# Patient Record
Sex: FEMALE | Race: OTHER | HISPANIC OR LATINO
[De-identification: names, ages, dates, MRNs, and addresses within clinical notes are randomized per-mention and may not be internally consistent; named-entity substitution may affect disease eponyms.]

---

## 2022-04-12 ENCOUNTER — FORM ENCOUNTER (OUTPATIENT)
Age: 27
End: 2022-04-12

## 2022-04-13 ENCOUNTER — INPATIENT (INPATIENT)
Facility: HOSPITAL | Age: 27
LOS: 0 days | Discharge: HOME | DRG: 101 | End: 2022-04-14
Attending: PSYCHIATRY & NEUROLOGY | Admitting: PSYCHIATRY & NEUROLOGY
Payer: MEDICAID

## 2022-04-13 VITALS
TEMPERATURE: 99 F | OXYGEN SATURATION: 97 % | DIASTOLIC BLOOD PRESSURE: 75 MMHG | SYSTOLIC BLOOD PRESSURE: 122 MMHG | HEART RATE: 92 BPM | RESPIRATION RATE: 18 BRPM | WEIGHT: 125 LBS

## 2022-04-13 LAB
ALBUMIN SERPL ELPH-MCNC: 4.6 G/DL — SIGNIFICANT CHANGE UP (ref 3.3–5)
ALP SERPL-CCNC: 52 U/L — SIGNIFICANT CHANGE UP (ref 40–120)
ALT FLD-CCNC: 27 U/L — SIGNIFICANT CHANGE UP (ref 10–45)
ANION GAP SERPL CALC-SCNC: 8 MMOL/L — SIGNIFICANT CHANGE UP (ref 5–17)
AST SERPL-CCNC: 27 U/L — SIGNIFICANT CHANGE UP (ref 10–40)
BASE EXCESS BLDV CALC-SCNC: 2.2 MMOL/L — SIGNIFICANT CHANGE UP (ref -2–3)
BASOPHILS # BLD AUTO: 0.04 K/UL — SIGNIFICANT CHANGE UP (ref 0–0.2)
BASOPHILS NFR BLD AUTO: 0.5 % — SIGNIFICANT CHANGE UP (ref 0–2)
BILIRUB SERPL-MCNC: 0.3 MG/DL — SIGNIFICANT CHANGE UP (ref 0.2–1.2)
BUN SERPL-MCNC: 12 MG/DL — SIGNIFICANT CHANGE UP (ref 7–23)
CA-I SERPL-SCNC: 1.23 MMOL/L — SIGNIFICANT CHANGE UP (ref 1.15–1.33)
CALCIUM SERPL-MCNC: 9.3 MG/DL — SIGNIFICANT CHANGE UP (ref 8.4–10.5)
CHLORIDE SERPL-SCNC: 102 MMOL/L — SIGNIFICANT CHANGE UP (ref 96–108)
CO2 BLDV-SCNC: 29.8 MMOL/L — HIGH (ref 22–26)
CO2 SERPL-SCNC: 27 MMOL/L — SIGNIFICANT CHANGE UP (ref 22–31)
CREAT SERPL-MCNC: 0.57 MG/DL — SIGNIFICANT CHANGE UP (ref 0.5–1.3)
EGFR: 127 ML/MIN/1.73M2 — SIGNIFICANT CHANGE UP
EOSINOPHIL # BLD AUTO: 0.07 K/UL — SIGNIFICANT CHANGE UP (ref 0–0.5)
EOSINOPHIL NFR BLD AUTO: 0.8 % — SIGNIFICANT CHANGE UP (ref 0–6)
ETHANOL SERPL-MCNC: <10 MG/DL — SIGNIFICANT CHANGE UP (ref 0–10)
GAS PNL BLDV: 133 MMOL/L — LOW (ref 136–145)
GAS PNL BLDV: SIGNIFICANT CHANGE UP
GLUCOSE SERPL-MCNC: 97 MG/DL — SIGNIFICANT CHANGE UP (ref 70–99)
HCG SERPL-ACNC: <0 MIU/ML — SIGNIFICANT CHANGE UP
HCO3 BLDV-SCNC: 28 MMOL/L — SIGNIFICANT CHANGE UP (ref 22–29)
HCT VFR BLD CALC: 39.2 % — SIGNIFICANT CHANGE UP (ref 34.5–45)
HGB BLD-MCNC: 12.9 G/DL — SIGNIFICANT CHANGE UP (ref 11.5–15.5)
IMM GRANULOCYTES NFR BLD AUTO: 0.6 % — SIGNIFICANT CHANGE UP (ref 0–1.5)
LACTATE SERPL-SCNC: 0.9 MMOL/L — SIGNIFICANT CHANGE UP (ref 0.5–2)
LYMPHOCYTES # BLD AUTO: 0.9 K/UL — LOW (ref 1–3.3)
LYMPHOCYTES # BLD AUTO: 10.9 % — LOW (ref 13–44)
MCHC RBC-ENTMCNC: 27.8 PG — SIGNIFICANT CHANGE UP (ref 27–34)
MCHC RBC-ENTMCNC: 32.9 GM/DL — SIGNIFICANT CHANGE UP (ref 32–36)
MCV RBC AUTO: 84.5 FL — SIGNIFICANT CHANGE UP (ref 80–100)
MONOCYTES # BLD AUTO: 0.52 K/UL — SIGNIFICANT CHANGE UP (ref 0–0.9)
MONOCYTES NFR BLD AUTO: 6.3 % — SIGNIFICANT CHANGE UP (ref 2–14)
NEUTROPHILS # BLD AUTO: 6.71 K/UL — SIGNIFICANT CHANGE UP (ref 1.8–7.4)
NEUTROPHILS NFR BLD AUTO: 80.9 % — HIGH (ref 43–77)
NRBC # BLD: 0 /100 WBCS — SIGNIFICANT CHANGE UP (ref 0–0)
PCO2 BLDV: 49 MMHG — HIGH (ref 39–42)
PH BLDV: 7.37 — SIGNIFICANT CHANGE UP (ref 7.32–7.43)
PLATELET # BLD AUTO: 237 K/UL — SIGNIFICANT CHANGE UP (ref 150–400)
PO2 BLDV: 53 MMHG — HIGH (ref 25–45)
POTASSIUM BLDV-SCNC: 4.1 MMOL/L — SIGNIFICANT CHANGE UP (ref 3.5–5.1)
POTASSIUM SERPL-MCNC: 4 MMOL/L — SIGNIFICANT CHANGE UP (ref 3.5–5.3)
POTASSIUM SERPL-SCNC: 4 MMOL/L — SIGNIFICANT CHANGE UP (ref 3.5–5.3)
PROT SERPL-MCNC: 7.2 G/DL — SIGNIFICANT CHANGE UP (ref 6–8.3)
RBC # BLD: 4.64 M/UL — SIGNIFICANT CHANGE UP (ref 3.8–5.2)
RBC # FLD: 13.8 % — SIGNIFICANT CHANGE UP (ref 10.3–14.5)
SAO2 % BLDV: 85.3 % — SIGNIFICANT CHANGE UP (ref 67–88)
SARS-COV-2 RNA SPEC QL NAA+PROBE: NEGATIVE — SIGNIFICANT CHANGE UP
SODIUM SERPL-SCNC: 137 MMOL/L — SIGNIFICANT CHANGE UP (ref 135–145)
WBC # BLD: 8.29 K/UL — SIGNIFICANT CHANGE UP (ref 3.8–10.5)
WBC # FLD AUTO: 8.29 K/UL — SIGNIFICANT CHANGE UP (ref 3.8–10.5)

## 2022-04-13 PROCEDURE — 70450 CT HEAD/BRAIN W/O DYE: CPT | Mod: 26,MA

## 2022-04-13 PROCEDURE — 95718 EEG PHYS/QHP 2-12 HR W/VEEG: CPT

## 2022-04-13 PROCEDURE — 99285 EMERGENCY DEPT VISIT HI MDM: CPT

## 2022-04-13 PROCEDURE — 93010 ELECTROCARDIOGRAM REPORT: CPT

## 2022-04-13 RX ORDER — SODIUM CHLORIDE 9 MG/ML
1000 INJECTION INTRAMUSCULAR; INTRAVENOUS; SUBCUTANEOUS ONCE
Refills: 0 | Status: COMPLETED | OUTPATIENT
Start: 2022-04-13 | End: 2022-04-13

## 2022-04-13 RX ORDER — ACETAMINOPHEN 500 MG
650 TABLET ORAL ONCE
Refills: 0 | Status: COMPLETED | OUTPATIENT
Start: 2022-04-13 | End: 2022-04-13

## 2022-04-13 RX ORDER — LEVETIRACETAM 250 MG/1
1000 TABLET, FILM COATED ORAL ONCE
Refills: 0 | Status: COMPLETED | OUTPATIENT
Start: 2022-04-13 | End: 2022-04-13

## 2022-04-13 RX ADMIN — LEVETIRACETAM 400 MILLIGRAM(S): 250 TABLET, FILM COATED ORAL at 16:45

## 2022-04-13 RX ADMIN — Medication 650 MILLIGRAM(S): at 16:34

## 2022-04-13 RX ADMIN — Medication 1 MILLIGRAM(S): at 16:45

## 2022-04-13 RX ADMIN — SODIUM CHLORIDE 1000 MILLILITER(S): 9 INJECTION INTRAMUSCULAR; INTRAVENOUS; SUBCUTANEOUS at 16:34

## 2022-04-13 NOTE — ED PROVIDER NOTE - PROGRESS NOTE DETAILS
[place]: labs/CT pending. d/w debbie from epilepsy - requesting call back once ed w/u complete. Will reassess. [place]: BF heard pt make a noise then pt became stiff and unresponsive - I was at pt bedside within 5 seconds (pt was in [facility]) - pt having generalized tonic/conic shaking of b/l UE/LE, +blood tinged foam at mouth. Shaking lasted ~1min then resolved. Around 1 min later pt had another episode of generalized tonic/clonic shaking that lasted 10 seconds then resolved. Pt very sleepy. rediscussed w/ epilepsy, will admit for further care. [place]: pt now awake, feels tired but no other complaints. new superficial abrasion on L asapect of tongue.

## 2022-04-13 NOTE — PATIENT PROFILE ADULT - FALL HARM RISK - HARM RISK INTERVENTIONS

## 2022-04-13 NOTE — ED PROVIDER NOTE - OBJECTIVE STATEMENT
26F no PMH p/w concern for seizure. 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 Next thing she remembers is being on floor w/ paramedics around her. Pt states that bystanders told her she had a seizure. Pt currently feels anxious but otherwise asymptomatic. Jan 2022 pt had episode of LOC - this episode was witness by her BF - he states that he heard thud and found pt on floor w/ generalized body shaking - unsure who long shaking lasted, eventually resolved. At that time pt had confusion until she arrived at hospital. Reportedly had blood work and CTH wnl. Has not followed up w/ neuro yet. No H seizures. Currently only c/o mild R hip pain where she thinks she fell today.   Has never been officially diagnosed w/ anxiety but states she feels that she has been having panic attacks recently and her PMD referred her to therapist. Not on any meds.   Uses MJ, no other drug/etoh use. No other systemic symptoms.   Denies HA, tongue pain, SOB/CP, URI symptoms, NVD, abd pain, urinary complaints, focal weakness/numbness.

## 2022-04-13 NOTE — ED PROVIDER NOTE - PHYSICAL EXAMINATION
no LE edema, normal equal distal pulses, steady unassisted gait.   No spinal ttp, neck FROM. Strength 5/5. No bony ttp, FROM all extremities. Normal equal distal pulses. Steady unassisted gait.   small superficial abrasion R lateral tongue and tip of tongue, no active bleeding.

## 2022-04-13 NOTE — H&P ADULT - ATTENDING COMMENTS
27 yo F admitted s/p witnessed seizure yesterday. Reporting episodes of dejavu and panic attacks over the past year.  Seen by Neuro in March 2022 and 1 hour routine EEG negative, unsure of MRI brain completed  EEG overnight showed left temporal spikes  Discussed findings with patient and diagnosis of epilepsy  Patient also reports stressors including brother who recently passed away  Will start LTG titration and LEV 500mg BID for now, once LTG therapeutic with stop LEV   Counseled on seizure safety precautions and [place] driving regulations   F/u outpatient in 3-4 weeks
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
## 2022-04-13 NOTE — ED ADULT NURSE NOTE - OBJECTIVE STATEMENT
pt had a witnessed seizure today ,did not hit head , no obvious injuries , pt had a similar episode in January

## 2022-04-13 NOTE — ED PROVIDER NOTE - CLINICAL SUMMARY MEDICAL DECISION MAKING FREE TEXT BOX
26F no PMH p/w concern for seizure. 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 Next thing she remembers is being on floor w/ paramedics around her. Pt states that bystanders told her she had a seizure. Pt currently feels anxious but otherwise asymptomatic. Jan 2022 pt had episode of LOC - this episode was witness by her BF - he states that he heard thud and found pt on floor w/ generalized body shaking - unsure who long shaking lasted, eventually resolved. At that time pt had confusion until she arrived at hospital. Reportedly had blood work and CTH wnl. Has not followed up w/ neuro yet. No H seizures. Currently only c/o mild R hip pain where she thinks she fell today.   Vitals wnl, exam as above.  ddx: concern for 2nd seizure. No clinically significant traumatic injury.  Labs, CT, attempt to d/w epilepsy.

## 2022-04-13 NOTE — ED ADULT TRIAGE NOTE - CHIEF COMPLAINT QUOTE
PT mg from work presents w/ bystander report of 2 min grand mal appearing seizure. Per EMS report bystander assisted pt to the ground and held her head. PT denies incontinence, states she feels like she bit her tongue, denies difficulty speaking or swallowing. Pt alert, oriented X3. Pt reports similar event in january w/o neurology follow up, reports she had an outpatient ct scan 1 month ago to investigate panic attacks.

## 2022-04-13 NOTE — H&P ADULT - ASSESSMENT
26y Female with reported history of "panic attacks", no other PMHx, presented to ED with witnessed seizure episodes, with tongue biting.    #New onset seizures  -CTH negative, infectious and metabolic workup negative  -12-24H VEEG monitoring  -Seizure precautions  -Ativan 2 mg IV PRN for seizures> 2min  -Keep Mg>2  -Fall precautions    #No indication for DVT/GI prophylaxis  #Ambulate as tolerated  #Diet: Regular   26y Female with reported history of "panic attacks", no other PMHx, presented to ED with witnessed seizure episodes, with tongue biting.    #New onset seizures  -CTH negative, infectious and metabolic workup negative  -12-24H VEEG monitoring  -Seizure precautions  -Ativan 2 mg IV PRN for seizures> 2min  -Keep Mg>2  -Fall precautions  -No AED for now pending VEEG results    #No indication for DVT/GI prophylaxis  #Ambulate as tolerated  #Diet: Regular

## 2022-04-14 ENCOUNTER — TRANSCRIPTION ENCOUNTER (OUTPATIENT)
Age: 27
End: 2022-04-14

## 2022-04-14 VITALS
OXYGEN SATURATION: 99 % | DIASTOLIC BLOOD PRESSURE: 61 MMHG | TEMPERATURE: 98 F | RESPIRATION RATE: 16 BRPM | SYSTOLIC BLOOD PRESSURE: 97 MMHG | HEART RATE: 60 BPM

## 2022-04-14 PROBLEM — Z00.00 ENCOUNTER FOR PREVENTIVE HEALTH EXAMINATION: Status: ACTIVE | Noted: 2022-04-14

## 2022-04-14 LAB
AMPHET UR-MCNC: NEGATIVE — SIGNIFICANT CHANGE UP
BARBITURATES UR SCN-MCNC: NEGATIVE — SIGNIFICANT CHANGE UP
BENZODIAZ UR-MCNC: NEGATIVE — SIGNIFICANT CHANGE UP
COCAINE METAB.OTHER UR-MCNC: NEGATIVE — SIGNIFICANT CHANGE UP
METHADONE UR-MCNC: NEGATIVE — SIGNIFICANT CHANGE UP
OPIATES UR-MCNC: NEGATIVE — SIGNIFICANT CHANGE UP
PCP SPEC-MCNC: SIGNIFICANT CHANGE UP
PCP UR-MCNC: NEGATIVE — SIGNIFICANT CHANGE UP
THC UR QL: POSITIVE

## 2022-04-14 PROCEDURE — 99223 1ST HOSP IP/OBS HIGH 75: CPT

## 2022-04-14 PROCEDURE — 95720 EEG PHY/QHP EA INCR W/VEEG: CPT

## 2022-04-14 RX ORDER — LAMOTRIGINE 25 MG/1
50 TABLET, ORALLY DISINTEGRATING ORAL EVERY 24 HOURS
Refills: 0 | Status: CANCELLED | OUTPATIENT
Start: 2022-04-28 | End: 2022-04-14

## 2022-04-14 RX ORDER — LAMOTRIGINE 25 MG/1
1 TABLET, ORALLY DISINTEGRATING ORAL
Qty: 30 | Refills: 3
Start: 2022-04-14 | End: 2022-08-11

## 2022-04-14 RX ORDER — LAMOTRIGINE 25 MG/1
1 TABLET, ORALLY DISINTEGRATING ORAL
Qty: 30 | Refills: 1
Start: 2022-04-14 | End: 2022-06-12

## 2022-04-14 RX ORDER — LEVETIRACETAM 250 MG/1
1 TABLET, FILM COATED ORAL
Qty: 60 | Refills: 2
Start: 2022-04-14 | End: 2022-07-12

## 2022-04-14 RX ORDER — LEVETIRACETAM 250 MG/1
1 TABLET, FILM COATED ORAL
Qty: 60 | Refills: 4
Start: 2022-04-14 | End: 2022-09-10

## 2022-04-14 RX ORDER — LEVETIRACETAM 250 MG/1
500 TABLET, FILM COATED ORAL EVERY 12 HOURS
Refills: 0 | Status: DISCONTINUED | OUTPATIENT
Start: 2022-04-14 | End: 2022-04-14

## 2022-04-14 RX ORDER — LAMOTRIGINE 25 MG/1
25 TABLET, ORALLY DISINTEGRATING ORAL EVERY 24 HOURS
Refills: 0 | Status: DISCONTINUED | OUTPATIENT
Start: 2022-04-14 | End: 2022-04-14

## 2022-04-14 RX ORDER — LAMOTRIGINE 25 MG/1
100 TABLET, ORALLY DISINTEGRATING ORAL EVERY 24 HOURS
Refills: 0 | Status: CANCELLED | OUTPATIENT
Start: 2022-05-11 | End: 2022-04-14

## 2022-04-14 RX ORDER — LAMOTRIGINE 25 MG/1
2 TABLET, ORALLY DISINTEGRATING ORAL
Qty: 28 | Refills: 0
Start: 2022-04-14 | End: 2022-04-27

## 2022-04-14 RX ORDER — LAMOTRIGINE 25 MG/1
1 TABLET, ORALLY DISINTEGRATING ORAL
Qty: 13 | Refills: 0
Start: 2022-04-14 | End: 2022-04-26

## 2022-04-14 RX ADMIN — LEVETIRACETAM 500 MILLIGRAM(S): 250 TABLET, FILM COATED ORAL at 15:49

## 2022-04-14 RX ADMIN — LAMOTRIGINE 25 MILLIGRAM(S): 25 TABLET, ORALLY DISINTEGRATING ORAL at 15:49

## 2022-04-14 NOTE — PROGRESS NOTE ADULT - SUBJECTIVE AND OBJECTIVE BOX
INTERVAL HPI/OVERNIGHT EVENTS:  O/N:  This morning: Patient was seen and examined at bedside.      VITAL SIGNS:  T(F): 98.2 (04-14-22 @ 12:08)  HR: 60 (04-14-22 @ 12:08)  BP: 97/61 (04-14-22 @ 12:08)  RR: 16 (04-14-22 @ 12:08)  SpO2: 99% (04-14-22 @ 12:08)  Wt(kg): --    PHYSICAL EXAM:    Constitutional: NAD  HEENT: PERRL, EOMI, sclera non-icteric, neck supple, trachea midline, no masses, no JVD, MMM, good dentition  Respiratory: CTA b/l, good air entry b/l, no wheezing, no rhonchi, no rales, without accessory muscle use and no intercostal retractions  Cardiovascular: RRR, normal S1S2, no M/R/G  Gastrointestinal: abdomen soft, NTND, no masses palpable, BS normal  Extremities: Warm, well perfused, pulses equal bilateral upper and lower extremities, no edema, no clubbing  Neurological: AAOx3, CN Grossly intact  Skin: Normal temperature, warm, dry    MEDICATIONS  (STANDING):    MEDICATIONS  (PRN):  LORazepam   Injectable 2 milliGRAM(s) IV Push once PRN seizures      Allergies    No Known Allergies    Intolerances        LABS:                        12.9   8.29  )-----------( 237      ( 13 Apr 2022 16:30 )             39.2     04-13    137  |  102  |  12  ----------------------------<  97  4.0   |  27  |  0.57    Ca    9.3      13 Apr 2022 16:30    TPro  7.2  /  Alb  4.6  /  TBili  0.3  /  DBili  x   /  AST  27  /  ALT  27  /  AlkPhos  52  04-13                RADIOLOGY & ADDITIONAL TESTS:  Reviewed

## 2022-04-14 NOTE — DISCHARGE NOTE NURSING/CASE MANAGEMENT/SOCIAL WORK - NSDCPEFALRISK_GEN_ALL_CORE
For information on Fall & Injury Prevention, visit: [email or web address] OR  [email or web address] OR  https://www.cdc.gov/steadi/patient.html

## 2022-04-14 NOTE — DISCHARGE NOTE PROVIDER - NSDCFUSCHEDAPPT_GEN_ALL_CORE_FT
JOLEEN RIVERA ; 06/27/2022 ; NPP Neuro [address] JOLEEN RIVERA ; 05/13/2022 ; P Neuro [address]  JOLEEN RIVERA ; 06/27/2022 ; [facility] Neuro [address]

## 2022-04-14 NOTE — DISCHARGE NOTE PROVIDER - NSDCMRMEDTOKEN_GEN_ALL_CORE_FT
LaMICtal 25 mg oral tablet: Please take one (25mg) tablet once per day until 4/28  lamoTRIgine 100 mg oral tablet: 1 tab(s) orally once a day starting on 5/12  lamoTRIgine 25 mg oral tablet: 2 tab(s) orally once a day from 4/28-5/12  levETIRAcetam 500 mg oral tablet: 1 tab(s) orally every 12 hours

## 2022-04-14 NOTE — DISCHARGE NOTE PROVIDER - NSDCFUADDAPPT_GEN_ALL_CORE_FT
Please bring your Insurance card, Photo ID and Discharge paperwork for the following appointment:    (1) Please follow up with your Neurology Provider, Dr. Brenda Ward at [address], [address] [place] [address] on 06/27/2022 at 4:20pm.    Please note that the office of Dr. Ward will contact you for an earlier appointment once available.    Appointment was scheduled by Ms. CAPRICE Piña, Referral Coordinator.

## 2022-04-14 NOTE — DISCHARGE NOTE PROVIDER - NSDCCPCAREPLAN_GEN_ALL_CORE_FT
PRINCIPAL DISCHARGE DIAGNOSIS  Diagnosis: Seizure  Assessment and Plan of Treatment: Instructions:  - Please take keppra 500 mg in the morning and at night  - Please take Lamotrigine 25 mg daily for two weeks, followe by 50 mg for two weeks, followed by 100 mg daily (dates on the bottle)  - Please follow up with Dr. Ward on Brenda Ward at [address] [place] [address] on 06/27/2022 at 4:20pm.

## 2022-04-14 NOTE — DISCHARGE NOTE NURSING/CASE MANAGEMENT/SOCIAL WORK - NSDCFUADDAPPT_GEN_ALL_CORE_FT
Please bring your Insurance card, Photo ID and Discharge paperwork for the following appointment:    (1) Please follow up with your Neurology Provider, Dr. Brenda Ward at [address], [address] [place] [address] on 06/27/2022 at 4:20pm.    Please note that the office of Dr. Ward will contact you for an earlier appointment once available.    Appointment was scheduled by Ms. CAPRICE Piña, Referral Coordinator.

## 2022-04-14 NOTE — EEG REPORT - NS EEG TEXT BOX
[facility] Department of Neurology  Epilepsy Monitoring Unit video-Electroencephalogram    Patient Name:	JOLEEN RIVERA    :	1995  MRN:	[id number]    Study Start Date/Time:  2022, 7:56 PM  Study End Date/Time:	in progress    Referred by: Brenda Ward MD    Brief Clinical History:  JOLEEN RIVERA is a 26 year-old Female admitted to EMU to characterize AMS/LOC events; study performed to investigate for seizures or markers of epilepsy.    Diagnosis Code:   R56.9 convulsions/seizure  CPT: 28690 EEG with video 12-26h  Technical CPT: 89459 set-up +  02836 vEEG Continuous monitoring 12-26h    Pertinent Medications:  n/a    Acquisition Details:  Electroencephalography was acquired using a minimum of 21 channels on an [employer] Neurology system v 8.5.1 with electrode placement according to the standard International 10-20 system following ACNS (American Clinical Neurophysiology Society) guidelines for Long-Term Video EEG monitoring.  Anterior temporal T1 and T2 electrodes were utilized whenever possible.   The [employer]TEK automated spike & seizure detections were all reviewed in detail, in addition to extensive portions of raw EEG.  The live video was continuously monitored by trained technicians to identify events and specialty nurses trained in seizure management supervised the care of the patient in the epilepsy unit.    Day 1: 2022 @ 7:56 PM to next morning @ 7:00 AM  Background: continuous, with predominantly alpha and beta frequencies.  Symmetry:  No persistent asymmetries of voltage or frequency.  Posterior Dominant Rhythm:  11 Hz symmetric, well-organized, and poorly-modulated.  Organization: Normal anterior to posterior gradient.  Voltage:  Normal (20+ uV)  Variability: Yes. 		Reactivity: Yes.  N2 sleep: Symmetric, synchronous spindles and K complexes.  Spontaneous Activity:  No epileptiform discharges.  Periodic/rhythmic activity:  None.  Events:  No electrographic seizures or significant clinical events.  Provocations:  Hyperventilation and Photic stimulation: was not performed.    Daily Summary:    Normal awake and asleep overnight EEG recording.    Samm Anderson DO  CNP Fellow    Brenda Ward MD  Attending Neurologist, [facility] Epilepsy Program   [facility] Department of Neurology  Epilepsy Monitoring Unit video-Electroencephalogram    Patient Name:	JOLEEN RIVERA    :	1995  MRN:	[id number]    Study Start Date/Time:  2022, 7:56 PM  Study End Date/Time:	in progress    Referred by: Brenda Ward MD    Brief Clinical History:  JOLEEN RIVERA is a 26 year-old Female admitted to EMU to characterize AMS/LOC events; study performed to investigate for seizures or markers of epilepsy.    Diagnosis Code:   R56.9 convulsions/seizure  CPT: 05632 EEG with video 12-26h  Technical CPT: 98957 set-up +  61078 vEEG Continuous monitoring 12-26h    Pertinent Medications:  n/a    Acquisition Details:  Electroencephalography was acquired using a minimum of 21 channels on an [employer] Neurology system v 8.5.1 with electrode placement according to the standard International 10-20 system following ACNS (American Clinical Neurophysiology Society) guidelines for Long-Term Video EEG monitoring.  Anterior temporal T1 and T2 electrodes were utilized whenever possible.   The [employer] automated spike & seizure detections were all reviewed in detail, in addition to extensive portions of raw EEG.  The live video was continuously monitored by trained technicians to identify events and specialty nurses trained in seizure management supervised the care of the patient in the epilepsy unit.    Day 1: 2022 @ 7:56 PM to next morning @ 7:00 AM  Background: continuous, with predominantly alpha and beta frequencies.  Symmetry:  No persistent asymmetries of voltage or frequency.  Posterior Dominant Rhythm:  11 Hz symmetric, well-organized, and poorly-modulated.  Organization: Normal anterior to posterior gradient.  Voltage:  Normal (20+ uV)  Variability: Yes. 		Reactivity: Yes.  N2 sleep: Symmetric, synchronous spindles and K complexes.  Spontaneous Activity:  Occasional (1+/hr <1/min) left temporal small spike waves with phase reversal at T3, present in N2 and slow wave sleep.   Periodic/rhythmic activity:  None.  Events:  No electrographic seizures or significant clinical events.  Provocations:  Hyperventilation and Photic stimulation: was not performed.    Daily Summary:    Occasional sleep-activated epileptiform discharges over the left temporal region. No electrographic seizures recorded.     Samm Anderson DO  CNP Fellow    Brenda Ward MD  Attending Neurologist, [facility] Epilepsy Program   [facility] Department of Neurology  Epilepsy Monitoring Unit video-Electroencephalogram    Patient Name:	JOLEEN RIVERA    :	1995  MRN:	[id number]    Study Start Date/Time:  2022, 7:56 PM  Study End Date/Time:	in progress    Referred by: Bernda Ward MD    Brief Clinical History:  JOLEEN RIVERA is a 26 year-old Female admitted to EMU to characterize AMS/LOC events; study performed to investigate for seizures or markers of epilepsy.    Diagnosis Code:   R56.9 convulsions/seizure  CPT: 16644 EEG with video 12-26h  Technical CPT: 22720 set-up +  92223 vEEG Continuous monitoring 12-26h    Pertinent Medications:  n/a    Acquisition Details:  Electroencephalography was acquired using a minimum of 21 channels on an [employer] Neurology system v 8.5.1 with electrode placement according to the standard International 10-20 system following ACNS (American Clinical Neurophysiology Society) guidelines for Long-Term Video EEG monitoring.  Anterior temporal T1 and T2 electrodes were utilized whenever possible.   The [employer] automated spike & seizure detections were all reviewed in detail, in addition to extensive portions of raw EEG.  The live video was continuously monitored by trained technicians to identify events and specialty nurses trained in seizure management supervised the care of the patient in the epilepsy unit.    Day 1: 2022 @ 7:56 PM to next morning @ 7:00 AM  Background: continuous, with predominantly alpha and beta frequencies.  Symmetry:  No persistent asymmetries of voltage or frequency.  Posterior Dominant Rhythm:  11 Hz symmetric, well-organized, and poorly-modulated.  Organization: Normal anterior to posterior gradient.  Voltage:  Normal (20+ uV)  Variability: Yes. 		Reactivity: Yes.  N2 sleep: Symmetric, synchronous spindles and K complexes.  Spontaneous Activity:  Occasional (1+/hr <1/min) left temporal spike waves with phase reversal at T3, present in N2 and slow wave sleep.   Periodic/rhythmic activity:  None.  Events:  No electrographic seizures or significant clinical events.  Provocations:  Hyperventilation and Photic stimulation: was not performed.    Daily Summary:    Occasional sleep-activated epileptiform discharges over the left temporal region. No electrographic seizures recorded.     Samm Anderson DO  CNP Fellow    Brenda Ward MD  Attending Neurologist, [facility] Epilepsy Program   [facility] Department of Neurology  Epilepsy Monitoring Unit video-Electroencephalogram    Patient Name:	JOLEEN RIVERA    :	1995  MRN:	[id number]    Study Start Date/Time:  2022, 7:56 PM   Study End Date/Time:	in progress    Referred by: Brenda Ward MD    Brief Clinical History:  JOLEEN RIVERA is a 26 year-old Female admitted to EMU to characterize AMS/LOC events; study performed to investigate for seizures or markers of epilepsy.    Diagnosis Code:   R56.9 convulsions/seizure  CPT: 49386 EEG with video 12-26h  Technical CPT: 63311 set-up +  12798 vEEG Continuous monitoring 12-26h    Pertinent Medications:  n/a    Acquisition Details:  Electroencephalography was acquired using a minimum of 21 channels on an [employer] Neurology system v 8.5.1 with electrode placement according to the standard International 10-20 system following ACNS (American Clinical Neurophysiology Society) guidelines for Long-Term Video EEG monitoring.  Anterior temporal T1 and T2 electrodes were utilized whenever possible.   The [employer] automated spike & seizure detections were all reviewed in detail, in addition to extensive portions of raw EEG.  The live video was continuously monitored by trained technicians to identify events and specialty nurses trained in seizure management supervised the care of the patient in the epilepsy unit.    Day 1: 2022 @ 7:56 PM to next morning @ 7:00 AM  Background: continuous, with predominantly alpha and beta frequencies.  Symmetry:  No persistent asymmetries of voltage or frequency.  Posterior Dominant Rhythm:  11 Hz symmetric, well-organized, and poorly-modulated.  Organization: Normal anterior to posterior gradient.  Voltage:  Normal (20+ uV)  Variability: Yes. 		Reactivity: Yes.  N2 sleep: Symmetric, synchronous spindles and K complexes.  Spontaneous Activity:  Occasional (1+/hr <1/min) left temporal spike waves with phase reversal at T3, present in N2 and slow wave sleep.   Periodic/rhythmic activity:  None.  Events:  No electrographic seizures or significant clinical events.  Provocations:  Hyperventilation and Photic stimulation: was not performed.    Daily Summary:    Occasional sleep-activated epileptiform discharges over the left temporal region. No electrographic seizures recorded.     Samm Anderson DO  CNP Fellow    Brenda Ward MD  Attending Neurologist, [facility] Epilepsy Program

## 2022-04-14 NOTE — DISCHARGE NOTE PROVIDER - CARE PROVIDER_API CALL
Brenda Ward)  EEGEpilepsy; Neurology  [address], [address] [place] [address]  Phone: [phone]  Fax: [phone]  Follow Up Time:    Brenda Ward)  EEGEpilepsy; Neurology  [address], [address] [place] [address]  Phone: [phone]  Fax: [phone]  Scheduled Appointment: 06/27/2022 04:20 PM

## 2022-04-14 NOTE — DISCHARGE NOTE PROVIDER - PROVIDER TOKENS
PROVIDER:[TOKEN:[[id number]]] PROVIDER:[TOKEN:[[id number]],SCHEDULEDAPPT:[06/27/2022],SCHEDULEDAPPTTIME:[04:20 PM]]

## 2022-04-14 NOTE — DISCHARGE NOTE PROVIDER - HOSPITAL COURSE
#Discharge: do not delete    JOLEEN RIVERA is a 26y Female with a past medical history of "panic attacks", no other PMHx, presented to ED with witnessed seizure episodes at work with tongue biting, patient has had one prior episode in the past prior labwork and CTH were normal, had a repeat seizure in the ED, admitted for new onset seizures for vEEG.     Problem List/Main Diagnoses (system-based):   # Seizure  Day 1: 4/13/2022 @ 7:56 PM to next morning @ 7:00 AM  Background: continuous, with predominantly alpha and beta frequencies.  Symmetry:  No persistent asymmetries of voltage or frequency.  Posterior Dominant Rhythm:  11 Hz symmetric, well-organized, and poorly-modulated.  Organization: Normal anterior to posterior gradient.  Voltage:  Normal (20+ uV)  Variability: Yes. 		Reactivity: Yes.  N2 sleep: Symmetric, synchronous spindles and K complexes.  Spontaneous Activity:  Occasional (1+/hr <1/min) left temporal spike waves with phase reversal at T3, present in N2 and slow wave sleep.   Periodic/rhythmic activity:  None.  Events:  No electrographic seizures or significant clinical events.  Provocations:  Hyperventilation and Photic stimulation: was not performed.    Daily Summary:    Occasional sleep-activated epileptiform discharges over the left temporal region. No electrographic seizures recorded.     New medications:   Labs to be followed outpatient:   Exam to be followed outpatient:     Physical Exam:       LABS & STUDIES:  COVID-19 PCR: Negative (13 Apr 2022 16:41)   #Discharge: do not delete    JOLEEN RIVERA is a 26y Female with a past medical history of "panic attacks", no other PMHx, presented to ED with witnessed seizure episodes at work with tongue biting, patient has had one prior episode in the past prior labwork and CTH were normal, had a repeat seizure in the ED, admitted for new onset seizures for vEEG.     Problem List/Main Diagnoses (system-based):   # Seizure  Labs, CTH, Utox all unremarkable other than THC. Etoh level negative     EEG Report   Day 1: 4/13/2022 @ 7:56 PM to next morning @ 7:00 AM  Background: continuous, with predominantly alpha and beta frequencies.  Symmetry:  No persistent asymmetries of voltage or frequency.  Posterior Dominant Rhythm:  11 Hz symmetric, well-organized, and poorly-modulated.  Organization: Normal anterior to posterior gradient.  Voltage:  Normal (20+ uV)  Variability: Yes. 		Reactivity: Yes.  N2 sleep: Symmetric, synchronous spindles and K complexes.  Spontaneous Activity:  Occasional (1+/hr <1/min) left temporal spike waves with phase reversal at T3, present in N2 and slow wave sleep.   Periodic/rhythmic activity:  None.  Events:  No electrographic seizures or significant clinical events.  Provocations:  Hyperventilation and Photic stimulation: was not performed.    Daily Summary:    Occasional sleep-activated epileptiform discharges over the left temporal region. No electrographic seizures recorded.     New medications:   - Keppra 500 BID  - lamotragine 25 for two weeks, followed by 50 for 2 weeks, followed by 100 daily    LABS & STUDIES:  COVID-19 PCR: Negative (13 Apr 2022 16:41)   #Discharge: do not delete    JOLEEN RIVERA is a 26y Female with a past medical history of "panic attacks", no other PMHx, presented to ED with witnessed seizure episodes at work with tongue biting, patient has had one prior episode in the past prior labwork and CTH were normal, had a repeat seizure in the ED, admitted for new onset seizures for vEEG.   EEG overnight showed left temporal discharges.    Problem List/Main Diagnoses (system-based):   # Seizure  Labs, CTH, Utox all unremarkable other than THC. Etoh level negative     EEG Report   Day 1: 4/13/2022 @ 7:56 PM to next morning @ 7:00 AM  Background: continuous, with predominantly alpha and beta frequencies.  Symmetry:  No persistent asymmetries of voltage or frequency.  Posterior Dominant Rhythm:  11 Hz symmetric, well-organized, and poorly-modulated.  Organization: Normal anterior to posterior gradient.  Voltage:  Normal (20+ uV)  Variability: Yes. 		Reactivity: Yes.  N2 sleep: Symmetric, synchronous spindles and K complexes.  Spontaneous Activity:  Occasional (1+/hr <1/min) left temporal spike waves with phase reversal at T3, present in N2 and slow wave sleep.   Periodic/rhythmic activity:  None.  Events:  No electrographic seizures or significant clinical events.  Provocations:  Hyperventilation and Photic stimulation: was not performed.    Daily Summary:    Occasional sleep-activated epileptiform discharges over the left temporal region. No electrographic seizures recorded.     New medications:   - Keppra 500 BID  - lamotragine 25 for two weeks, followed by 50 for 2 weeks, followed by 100 daily    LABS & STUDIES:  COVID-19 PCR: Negative (13 Apr 2022 16:41)

## 2022-04-14 NOTE — DISCHARGE NOTE NURSING/CASE MANAGEMENT/SOCIAL WORK - PATIENT PORTAL LINK FT
You can access the FollowMyHealth Patient Portal offered by [facility] by registering at the following website: [email or web address]. By joining [email or web address]’s FollowMyHealth portal, you will also be able to view your health information using other applications (apps) compatible with our system. none

## 2022-04-19 DIAGNOSIS — F41.0 PANIC DISORDER [EPISODIC PAROXYSMAL ANXIETY]: ICD-10-CM
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
## 2022-04-19 DIAGNOSIS — S00.512A ABRASION OF ORAL CAVITY, INITIAL ENCOUNTER: ICD-10-CM

## 2022-04-19 DIAGNOSIS — F12.99 CANNABIS USE, UNSPECIFIED WITH UNSPECIFIED CANNABIS-INDUCED DISORDER: ICD-10-CM
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
## 2022-04-19 DIAGNOSIS — R56.9 UNSPECIFIED CONVULSIONS: ICD-10-CM

## 2022-05-13 ENCOUNTER — APPOINTMENT (OUTPATIENT)
Dept: NEUROLOGY | Facility: CLINIC | Age: 27
End: 2022-05-13
Payer: MEDICAID

## 2022-05-13 ENCOUNTER — NON-APPOINTMENT (OUTPATIENT)
Age: 27
End: 2022-05-13

## 2022-05-13 ENCOUNTER — TRANSCRIPTION ENCOUNTER (OUTPATIENT)
Age: 27
End: 2022-05-13

## 2022-05-13 VITALS
WEIGHT: 126 LBS | OXYGEN SATURATION: 95 % | SYSTOLIC BLOOD PRESSURE: 105 MMHG | BODY MASS INDEX: 22.32 KG/M2 | HEART RATE: 77 BPM | DIASTOLIC BLOOD PRESSURE: 69 MMHG | TEMPERATURE: 97.5 F | HEIGHT: 63 IN

## 2022-05-13 PROCEDURE — 99215 OFFICE O/P EST HI 40 MIN: CPT

## 2022-05-16 ENCOUNTER — TRANSCRIPTION ENCOUNTER (OUTPATIENT)
Age: 27
End: 2022-05-16

## 2022-05-20 LAB
ALBUMIN SERPL ELPH-MCNC: 4.8 G/DL
ALP BLD-CCNC: 64 U/L
ALT SERPL-CCNC: 19 U/L
ANION GAP SERPL CALC-SCNC: 14 MMOL/L
AST SERPL-CCNC: 21 U/L
BASOPHILS # BLD AUTO: 0.03 K/UL
BASOPHILS NFR BLD AUTO: 0.6 %
BILIRUB SERPL-MCNC: 0.4 MG/DL
BUN SERPL-MCNC: 14 MG/DL
CALCIUM SERPL-MCNC: 9.8 MG/DL
CHLORIDE SERPL-SCNC: 106 MMOL/L
CO2 SERPL-SCNC: 22 MMOL/L
CREAT SERPL-MCNC: 0.61 MG/DL
EGFR: 124 ML/MIN/1.73M2
EOSINOPHIL # BLD AUTO: 0.12 K/UL
EOSINOPHIL NFR BLD AUTO: 2.4 %
GLUCOSE SERPL-MCNC: 95 MG/DL
HCT VFR BLD CALC: 42.5 %
HGB BLD-MCNC: 13.8 G/DL
IMM GRANULOCYTES NFR BLD AUTO: 0.6 %
LAMOTRIGINE SERPL-MCNC: 3.6 UG/ML
LEVETIRACETAM SERPL-MCNC: 19.8 UG/ML
LYMPHOCYTES # BLD AUTO: 1.24 K/UL
LYMPHOCYTES NFR BLD AUTO: 24.8 %
MAN DIFF?: NORMAL
MCHC RBC-ENTMCNC: 27.8 PG
MCHC RBC-ENTMCNC: 32.5 GM/DL
MCV RBC AUTO: 85.7 FL
MONOCYTES # BLD AUTO: 0.5 K/UL
MONOCYTES NFR BLD AUTO: 10 %
NEUTROPHILS # BLD AUTO: 3.07 K/UL
NEUTROPHILS NFR BLD AUTO: 61.6 %
PLATELET # BLD AUTO: 243 K/UL
POTASSIUM SERPL-SCNC: 4.6 MMOL/L
PROT SERPL-MCNC: 7.2 G/DL
RBC # BLD: 4.96 M/UL
RBC # FLD: 14.1 %
SODIUM SERPL-SCNC: 141 MMOL/L
WBC # FLD AUTO: 4.99 K/UL

## 2022-05-30 PROCEDURE — 99285 EMERGENCY DEPT VISIT HI MDM: CPT

## 2022-05-30 PROCEDURE — 85025 COMPLETE CBC W/AUTO DIFF WBC: CPT

## 2022-05-30 PROCEDURE — 82803 BLOOD GASES ANY COMBINATION: CPT

## 2022-05-30 PROCEDURE — 95716 VEEG EA 12-26HR CONT MNTR: CPT

## 2022-05-30 PROCEDURE — 84702 CHORIONIC GONADOTROPIN TEST: CPT

## 2022-05-30 PROCEDURE — 80307 DRUG TEST PRSMV CHEM ANLYZR: CPT

## 2022-05-30 PROCEDURE — 87635 SARS-COV-2 COVID-19 AMP PRB: CPT

## 2022-05-30 PROCEDURE — 70450 CT HEAD/BRAIN W/O DYE: CPT | Mod: MA

## 2022-05-30 PROCEDURE — 95700 EEG CONT REC W/VID EEG TECH: CPT

## 2022-05-30 PROCEDURE — 36415 COLL VENOUS BLD VENIPUNCTURE: CPT

## 2022-05-30 PROCEDURE — 80053 COMPREHEN METABOLIC PANEL: CPT

## 2022-05-30 PROCEDURE — 83605 ASSAY OF LACTIC ACID: CPT

## 2022-05-30 PROCEDURE — 84295 ASSAY OF SERUM SODIUM: CPT

## 2022-05-30 PROCEDURE — 84132 ASSAY OF SERUM POTASSIUM: CPT

## 2022-05-30 PROCEDURE — 82330 ASSAY OF CALCIUM: CPT

## 2022-06-01 ENCOUNTER — TRANSCRIPTION ENCOUNTER (OUTPATIENT)
Age: 27
End: 2022-06-01

## 2022-06-06 ENCOUNTER — NON-APPOINTMENT (OUTPATIENT)
Age: 27
End: 2022-06-06

## 2022-06-06 ENCOUNTER — APPOINTMENT (OUTPATIENT)
Dept: MRI IMAGING | Facility: CLINIC | Age: 27
End: 2022-06-06

## 2022-06-07 ENCOUNTER — TRANSCRIPTION ENCOUNTER (OUTPATIENT)
Age: 27
End: 2022-06-07

## 2022-06-07 ENCOUNTER — RESULT REVIEW (OUTPATIENT)
Age: 27
End: 2022-06-07

## 2022-06-07 ENCOUNTER — APPOINTMENT (OUTPATIENT)
Dept: MRI IMAGING | Facility: CLINIC | Age: 27
End: 2022-06-07
Payer: MEDICAID

## 2022-06-07 ENCOUNTER — OUTPATIENT (OUTPATIENT)
Dept: OUTPATIENT SERVICES | Facility: HOSPITAL | Age: 27
LOS: 1 days | End: 2022-06-07

## 2022-06-07 PROCEDURE — 70553 MRI BRAIN STEM W/O & W/DYE: CPT | Mod: 26

## 2022-06-07 PROCEDURE — 76377 3D RENDER W/INTRP POSTPROCES: CPT | Mod: 26

## 2022-06-09 ENCOUNTER — TRANSCRIPTION ENCOUNTER (OUTPATIENT)
Age: 27
End: 2022-06-09

## 2022-06-27 ENCOUNTER — APPOINTMENT (OUTPATIENT)
Dept: NEUROLOGY | Facility: CLINIC | Age: 27
End: 2022-06-27

## 2022-07-06 ENCOUNTER — TRANSCRIPTION ENCOUNTER (OUTPATIENT)
Age: 27
End: 2022-07-06

## 2022-07-07 ENCOUNTER — TRANSCRIPTION ENCOUNTER (OUTPATIENT)
Age: 27
End: 2022-07-07

## 2022-07-13 ENCOUNTER — APPOINTMENT (OUTPATIENT)
Dept: NEUROLOGY | Facility: CLINIC | Age: 27
End: 2022-07-13

## 2022-07-13 VITALS
BODY MASS INDEX: 21.44 KG/M2 | HEART RATE: 89 BPM | TEMPERATURE: 98.3 F | DIASTOLIC BLOOD PRESSURE: 76 MMHG | SYSTOLIC BLOOD PRESSURE: 114 MMHG | HEIGHT: 63 IN | WEIGHT: 121 LBS | OXYGEN SATURATION: 96 %

## 2022-07-13 PROCEDURE — 99214 OFFICE O/P EST MOD 30 MIN: CPT

## 2022-08-03 ENCOUNTER — TRANSCRIPTION ENCOUNTER (OUTPATIENT)
Age: 27
End: 2022-08-03

## 2022-08-08 ENCOUNTER — APPOINTMENT (OUTPATIENT)
Dept: NEUROLOGY | Facility: CLINIC | Age: 27
End: 2022-08-08

## 2022-08-08 PROCEDURE — 95819 EEG AWAKE AND ASLEEP: CPT

## 2022-08-09 ENCOUNTER — APPOINTMENT (OUTPATIENT)
Dept: NEUROLOGY | Facility: CLINIC | Age: 27
End: 2022-08-09

## 2022-08-09 PROCEDURE — 95719 EEG PHYS/QHP EA INCR W/O VID: CPT

## 2022-08-09 PROCEDURE — 95708 EEG WO VID EA 12-26HR UNMNTR: CPT

## 2022-08-09 PROCEDURE — 95700 EEG CONT REC W/VID EEG TECH: CPT

## 2022-08-14 ENCOUNTER — EMERGENCY (EMERGENCY)
Facility: HOSPITAL | Age: 27
LOS: 1 days | Discharge: HOME | End: 2022-08-14
Attending: EMERGENCY MEDICINE | Admitting: EMERGENCY MEDICINE
Payer: MEDICAID

## 2022-08-14 VITALS
HEART RATE: 86 BPM | OXYGEN SATURATION: 98 % | TEMPERATURE: 98 F | HEIGHT: 63 IN | DIASTOLIC BLOOD PRESSURE: 74 MMHG | WEIGHT: 123.68 LBS | SYSTOLIC BLOOD PRESSURE: 161 MMHG | RESPIRATION RATE: 16 BRPM

## 2022-08-14 LAB
APPEARANCE UR: CLEAR — SIGNIFICANT CHANGE UP
BILIRUB UR-MCNC: NEGATIVE — SIGNIFICANT CHANGE UP
COLOR SPEC: YELLOW — SIGNIFICANT CHANGE UP
DIFF PNL FLD: NEGATIVE — SIGNIFICANT CHANGE UP
GLUCOSE UR QL: NEGATIVE — SIGNIFICANT CHANGE UP
KETONES UR-MCNC: NEGATIVE — SIGNIFICANT CHANGE UP
LEUKOCYTE ESTERASE UR-ACNC: NEGATIVE — SIGNIFICANT CHANGE UP
NITRITE UR-MCNC: NEGATIVE — SIGNIFICANT CHANGE UP
PH UR: 6 — SIGNIFICANT CHANGE UP (ref 5–8)
PROT UR-MCNC: NEGATIVE MG/DL — SIGNIFICANT CHANGE UP
SP GR SPEC: 1.01 — SIGNIFICANT CHANGE UP (ref 1–1.03)
UROBILINOGEN FLD QL: 0.2 E.U./DL — SIGNIFICANT CHANGE UP

## 2022-08-14 PROCEDURE — 82962 GLUCOSE BLOOD TEST: CPT

## 2022-08-14 PROCEDURE — 81003 URINALYSIS AUTO W/O SCOPE: CPT

## 2022-08-14 PROCEDURE — 99283 EMERGENCY DEPT VISIT LOW MDM: CPT

## 2022-08-14 RX ORDER — VALACYCLOVIR 500 MG/1
1 TABLET, FILM COATED ORAL
Qty: 20 | Refills: 0
Start: 2022-08-14 | End: 2022-08-20

## 2022-08-14 RX ORDER — VALACYCLOVIR 500 MG/1
1000 TABLET, FILM COATED ORAL ONCE
Refills: 0 | Status: COMPLETED | OUTPATIENT
Start: 2022-08-14 | End: 2022-08-14

## 2022-08-14 RX ADMIN — VALACYCLOVIR 1000 MILLIGRAM(S): 500 TABLET, FILM COATED ORAL at 13:02

## 2022-08-14 RX ADMIN — Medication 60 MILLIGRAM(S): at 13:02

## 2022-08-14 NOTE — ED PROVIDER NOTE - PATIENT PORTAL LINK FT
You can access the FollowMyHealth Patient Portal offered by [facility] by registering at the following website: [email or web address]. By joining [employer]’s FollowMyHealth portal, you will also be able to view your health information using other applications (apps) compatible with our system.

## 2022-08-14 NOTE — ED PROVIDER NOTE - NSFOLLOWUPINSTRUCTIONS_ED_ALL_ED_FT
Please take medications as directed (valtrex, prednisone) and purchase lubricating eye drops and ointment at the pharmacy to help prevent eye drying and injury. Please follow up with ophthalmology if you experience eye discomfort due to not being able to close eyelid.     Return to the Emergency Department if you have any new or worsening symptoms, or for any other concerns. Please read below for further information.    Posada Palsy    WHAT YOU NEED TO KNOW:    Bell palsy is a sudden weakness or paralysis of one side of your face. Bell palsy occurs when the nerve that controls the muscles in your face becomes swollen or irritated.     DISCHARGE INSTRUCTIONS:    Medicines:   •Medicine may be given to decrease swelling and irritation of your facial nerve. You may receive antiviral medicine if your healthcare provider thinks a virus caused your Bell palsy. Your healthcare provider may also suggest acetaminophen or ibuprofen to reduce pain. These medicines are available without a doctor's order. Ask which medicine to take, and how much you need. Follow directions. Acetaminophen can cause liver damage, and ibuprofen can cause stomach bleeding or kidney damage.       •Take your medicine as directed. Contact your healthcare provider if you think your medicine is not helping or if you have side effects. Tell him if you are allergic to any medicine. Keep a list of the medicines, vitamins, and herbs you take. Include the amounts, and when and why you take them. Bring the list or the pill bottles to follow-up visits. Carry your medicine list with you in case of an emergency.      Follow up with your healthcare provider as directed: Write down your questions so you remember to ask them during your visits.    Eye care: Your healthcare provider may recommend that you use artificial tears during the day to keep your eye moist. You may need to use an eye ointment at night. You may also need to wear a patch over your eye and tape it shut while you sleep. This helps keep your eye from getting dry and infected. Wear sunglasses to protect your eye from direct sunlight. Stay away from places that have fumes, dust, or other particles in the air that may harm your eye.    Physical therapy: A physical therapist may teach you how to massage your face and exercise the nerves and muscles in your face. This may help you get better sooner and prevent long-term problems. You can exercise on your own when your facial movement begins to return. Open and close your eye, wink, and smile wide. Do the exercises for 15 or 20 minutes several times a day.    Contact your healthcare provider if:   •You have a fever.      •Your eye becomes red, irritated, or painful.      •You have questions or concerns about your condition or care.      Return to the emergency department if:   •You develop weakness or numbness on one side of your body (other than your face).      •You have double vision, or you lose vision in your eye.      •You have trouble thinking clearly.

## 2022-08-14 NOTE — ED ADULT NURSE NOTE - OBJECTIVE STATEMENT
Pt with pmx of epilepsy presented to ED with c/o of slight right eye drooping that began yesterday. Pt reports right sided mouth numbness. AOX4. VSS. Any form of distress not noted. Patient oriented to ED area. All needs attended. Rounding in progress. Fall risk precautions maintained. Pt with pmx of epilepsy presented to ED with c/o of slight right eye drooping that began yesterday. Pt reports right sided mouth numbness. Was diagnosed with Bell's during recent telehealth appointment. AOX4. VSS. Any form of distress not noted. Patient oriented to ED area. All needs attended. Rounding in progress. Fall risk precautions maintained.

## 2022-08-14 NOTE — ED ADULT TRIAGE NOTE - BEFAST EYES
OT ACUTE Treatment Session    Pt seen on 3cd nursing unit.                                                          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   Current overall ADL status is supervision/set up for hygiene and grooming activity standing in front of  his recliner.  Current functional mobility for ADL and Instrumental-ADL tasks is supervision for dynamic standing for 3 minutes each time  before requiring rest , via 2 ww and a gait belt.   Patient  displays  fair progress demonstrated by deconditioning and pain.    Limitations at this time include weakness, fatigue, pain and medical status. Patient will benefit from further skilled OT for continued training with AE prn for LB cares  to help the patient meet goal of return to home.  Discussed patient goal of return to home, discharge planning options and therapy progress made to date with Patient.       RECOMMENDATIONS FOR DISCHARGE:  Recommendations for Discharge: OT: Home;Home therapy (07/25/19 1145)    OT Identified Barriers to Discharge: impaired self cares, safety, medical status     PT/OT Mobility Equipment for Discharge: May benefit from 2ww. Will continue to assess (07/25/19 1145)  PT/OT ADL Equipment for Discharge: Will monitor (07/25/19 1145)    Assistance needed when returning home:   Discussed with patient/caregiver who acknowledged understanding of current recommendations for safe home discharge plan. Based on current level of assistance, recommendations are:  home, home therapy. Help to be provided by caregiver.    ICU Mobility Assesment (PERME):         PLAN: Continue skilled OT, including the following Treatment Interventions: ADL retraining;Functional transfer training;UE strengthening/ROM;Endurance training;Patient/Family training;Equipment eval/education;Compensatory technique education (07/25/19 1145)   Treatment Plan for Next Session: dressing AE prn   Additional Plan Considerations: standing monica/ bal  Plan Comments: pt. reqquires encouragement to be more active    EDUCATION:   On this date, the patient was educated on the role of OT.    The response to education was: Needs reinforcement                                                    SUBJECTIVE: Patient's Personal Goal: home (07/25/19 1145)   Subjective: pt. agreeable to the session.   (07/25/19 1145)  Subjective/Objective Comments: FELI Larson okayed the session.  End of the session pt. was up in [employer] call light and needs within reach (07/25/19 1145)    OBJECTIVE:Basic Lines: IV;Conde catheter (07/25/19 1145)  Complex Lines: J.P. drain (07/25/19 1145)    RN reported Bautista Fall Scale Score: 60       Last 24 hours of Functional Data     ADLs  Self Cares/ADL's  Grooming Assistance: Set-up;Clean-up (07/25/19 1145)  Grooming/Oral Hygiene Deficit: Wash/dry hands;Wash/dry face;Increased time to complete (07/25/19 1145)  Self Cares/ADL's Comments #1: pt. deferrd all other self cares stating he had done them earlier. (07/25/19 1145)    Household mobility  Household Mobility  Sit to Stand: Supervision (07/25/19 1145)  Stand to Sit: Supervision (07/25/19 1145)  Transfer Equipment: gait belt 2 ww (07/25/19 1145)  Sitting - Static: Independent (07/25/19 1145)  Sitting - Dynamic: Independent (07/25/19 1145)  Standing - Static: Supervision (07/25/19 1145)  Standing - Dynamic: Supervision (07/25/19 1145)  Household Mobility Comments #1: pt. requires supervision for stand to sit transition , gait belt and increased time to  complete. (07/25/19 1145)  Household Mobility Comments #2: pt. deferrd ambulation, participated in dynamic standing with functional UE activity approx 3 minutes b, with gait blt and 2 ww, before requiring a seated  rest. (07/25/19 1145)    Home Management       Other Interventions  Other Interventions 1: Educated the pt. on th role of OT (07/25/19 1145)      Tolerance  OT Activity Tolerance  Activity Tolerance: 1:1 Activity to rest (07/25/19 1145)  Vital Signs: stable (07/25/19 1145)  Activity Tolerance Comments: fair limited activity tolerance due to weakness, deconditioning (07/25/19 1145)    Cognition  Communication/Cognition  Communication: Clear speech (07/25/19 1145)  Overall Cognitive Status: Within Functional Limits (07/25/19 1145)    Patient's Personal Goal: home (07/25/19 1145)    Therapy Goals:    Goals  Short Term Goals to Be Reviewed On: 07/29/19 (07/25/19 1145)  Short Term Goals Are The Same as Discharge Goals: Yes (07/22/19 1320)  Goal Agreement: Patient agrees with goals and treatment plan (07/22/19 1320)  Grooming Discharge Goal 1: Modified I (07/22/19 1320)  Grooming Discharge Goal Progress 1: Outcome not met, continue to monitor (07/22/19 1320)  Dressing Discharge Goal 1: Modified i (07/22/19 1320)  Dressing Discharge Goal Progress 1: Outcome not met, continue to monitor (07/22/19 1320)  Toileting Discharge Goal 1: Modiied I (07/22/19 1320)  Toileting Discharge Goal Progress 1: Outcome not met, continue to monitor (07/22/19 1320)  Home Setting Transfer Discharge Goal 1: Modified I (07/22/19 1320)  Home Setting Transfer Discharge Goal Progress 1: Outcome not met, continue to monitor (07/22/19 1320)        Total Treatment Time:  OT Time Spent: 45 minutes (07/25/19 1145)    See OT flowsheet for full details regarding the OT therapy provided.      No

## 2022-08-14 NOTE — ED ADULT TRIAGE NOTE - CHIEF COMPLAINT QUOTE
Pt presents to the ED c/o right eye drooping that began yesterday. Pt reports right sided mouth numbness that began upon awakening this morning. Pt went to bed around midnight w/o sx. Denies numbness to UE and LE, weakness, CP, SOB, headache, slurred speech, vision changes. Pt w/ hx of epilepsy.

## 2022-08-14 NOTE — ED PROVIDER NOTE - PHYSICAL EXAMINATION
GEN: Well appearing, well developed, awake, alert, oriented to person, place, time/situation and in no apparent distress. NTAF  ENT: Airway patent, Nasal mucosa clear. Mouth with normal mucosa. Midline Scratch to roof of mouth  EYES: Clear bilaterally. PERRL, EOMI  RESPIRATORY: Breathing comfortably with normal RR. No W/C/R, no hypoxia or resp distress.  CARDIAC: Regular rate and rhythm, no M/R/G  ABDOMEN: Soft, nontender, +bowel sounds, no rebound, rigidity, or guarding.  MSK: Range of motion is not limited, no deformities noted.  NEURO: Alert and oriented x 3. R CN 7 palsy (mild/early), otherwise Cn 2-12 intact. Strength 5/5 and sensation intact in all 4 extremities. no pronator drift. FTN normal.  Gait normal.     SKIN: Skin normal color for race, warm, dry and intact. No evidence of rash.  PSYCH: Alert and oriented to person, place, time/situation. normal mood and affect. no apparent risk to self or others.

## 2022-08-14 NOTE — ED PROVIDER NOTE - CLINICAL SUMMARY MEDICAL DECISION MAKING FREE TEXT BOX
Exam is consistent Bell's palsy thus a stroke code was not activated. I explained the diagnosis, likely course of illness, and treatment plan for Bell's palsy to the patient, including valtrex, prednisone, eye drops and ointment, and the importance of compliance with eye lubricants to avoid eye injury. I advised pt to follow up with ophthalmology if they experience eye discomfort due to not being able to close eyelid. All questions answered and return precautions discussed. Stable for DC.

## 2022-08-14 NOTE — ED PROVIDER NOTE - OBJECTIVE STATEMENT
27F with a hx of epilepsy recently dx (followed at [facility] with Dr. Ward, MRI with left temporal fissure, on Keppra 500mg BID and titrating up Lamotrigine 200mg/150mg BID) who p/w right sided facial weakness that she started to notice yesterday. Started with her right eye not fully closing and feeling dry, the this am she noticed water dribbling out of her mouth when she was brushing her teeth. Reports mild HA yesterday that has since resolved. No trauma or fall, no vision or speech change, no f/c, no recent travel, no insect bites, no n/t/w in ext, no neck pain. She was seen by tele doc who told her it was likely Bell's and to go to er for eval.

## 2022-08-16 ENCOUNTER — TRANSCRIPTION ENCOUNTER (OUTPATIENT)
Age: 27
End: 2022-08-16

## 2022-08-16 DIAGNOSIS — G40.909 EPILEPSY, UNSPECIFIED, NOT INTRACTABLE, WITHOUT STATUS EPILEPTICUS: ICD-10-CM

## 2022-08-16 DIAGNOSIS — R29.810 FACIAL WEAKNESS: ICD-10-CM

## 2022-08-16 DIAGNOSIS — G51.0 BELL'S PALSY: ICD-10-CM

## 2022-08-18 ENCOUNTER — TRANSCRIPTION ENCOUNTER (OUTPATIENT)
Age: 27
End: 2022-08-18

## 2022-09-01 ENCOUNTER — TRANSCRIPTION ENCOUNTER (OUTPATIENT)
Age: 27
End: 2022-09-01

## 2022-09-02 ENCOUNTER — TRANSCRIPTION ENCOUNTER (OUTPATIENT)
Age: 27
End: 2022-09-02

## 2022-09-08 LAB
ALBUMIN SERPL ELPH-MCNC: 5.1 G/DL
ALP BLD-CCNC: 63 U/L
ALT SERPL-CCNC: 15 U/L
ANION GAP SERPL CALC-SCNC: 14 MMOL/L
APTT BLD: 31.3 SEC
AST SERPL-CCNC: 22 U/L
BASOPHILS # BLD AUTO: 0.03 K/UL
BASOPHILS NFR BLD AUTO: 0.4 %
BILIRUB SERPL-MCNC: 0.5 MG/DL
BUN SERPL-MCNC: 12 MG/DL
CALCIUM SERPL-MCNC: 10.2 MG/DL
CHLORIDE SERPL-SCNC: 101 MMOL/L
CO2 SERPL-SCNC: 24 MMOL/L
CREAT SERPL-MCNC: 0.78 MG/DL
EGFR: 105 ML/MIN/1.73M2
EOSINOPHIL # BLD AUTO: 0.01 K/UL
EOSINOPHIL NFR BLD AUTO: 0.1 %
GLUCOSE SERPL-MCNC: 79 MG/DL
HCT VFR BLD CALC: 45.1 %
HGB BLD-MCNC: 14.8 G/DL
IMM GRANULOCYTES NFR BLD AUTO: 0.4 %
INR PPP: 0.99 RATIO
LYMPHOCYTES # BLD AUTO: 0.95 K/UL
LYMPHOCYTES NFR BLD AUTO: 12.1 %
MAN DIFF?: NORMAL
MCHC RBC-ENTMCNC: 28.1 PG
MCHC RBC-ENTMCNC: 32.8 GM/DL
MCV RBC AUTO: 85.7 FL
MONOCYTES # BLD AUTO: 0.42 K/UL
MONOCYTES NFR BLD AUTO: 5.4 %
NEUTROPHILS # BLD AUTO: 6.39 K/UL
NEUTROPHILS NFR BLD AUTO: 81.6 %
PLATELET # BLD AUTO: 259 K/UL
POTASSIUM SERPL-SCNC: 4.7 MMOL/L
PROT SERPL-MCNC: 7.7 G/DL
PT BLD: 11.7 SEC
RBC # BLD: 5.26 M/UL
RBC # FLD: 13.6 %
SODIUM SERPL-SCNC: 138 MMOL/L
WBC # FLD AUTO: 7.83 K/UL

## 2022-09-12 ENCOUNTER — APPOINTMENT (OUTPATIENT)
Dept: NEUROLOGY | Facility: CLINIC | Age: 27
End: 2022-09-12

## 2022-09-12 ENCOUNTER — NON-APPOINTMENT (OUTPATIENT)
Age: 27
End: 2022-09-12

## 2022-09-12 VITALS
DIASTOLIC BLOOD PRESSURE: 69 MMHG | HEART RATE: 71 BPM | SYSTOLIC BLOOD PRESSURE: 113 MMHG | BODY MASS INDEX: 21.79 KG/M2 | TEMPERATURE: 98.1 F | WEIGHT: 123 LBS | HEIGHT: 63 IN | OXYGEN SATURATION: 98 %

## 2022-09-12 PROCEDURE — 99214 OFFICE O/P EST MOD 30 MIN: CPT

## 2022-09-12 RX ORDER — LAMOTRIGINE 150 MG/1
150 TABLET ORAL TWICE DAILY
Qty: 60 | Refills: 3 | Status: DISCONTINUED | COMMUNITY
Start: 2022-05-13 | End: 2022-09-12

## 2022-09-13 ENCOUNTER — TRANSCRIPTION ENCOUNTER (OUTPATIENT)
Age: 27
End: 2022-09-13

## 2022-09-15 ENCOUNTER — APPOINTMENT (OUTPATIENT)
Dept: NEUROLOGY | Facility: CLINIC | Age: 27
End: 2022-09-15

## 2022-09-21 LAB
LAMOTRIGINE SERPL-MCNC: 8.6 UG/ML
LEVETIRACETAM SERPL-MCNC: 12.8 UG/ML

## 2022-10-17 ENCOUNTER — TRANSCRIPTION ENCOUNTER (OUTPATIENT)
Age: 27
End: 2022-10-17

## 2022-10-20 ENCOUNTER — TRANSCRIPTION ENCOUNTER (OUTPATIENT)
Age: 27
End: 2022-10-20

## 2022-10-24 ENCOUNTER — TRANSCRIPTION ENCOUNTER (OUTPATIENT)
Age: 27
End: 2022-10-24

## 2022-11-07 ENCOUNTER — TRANSCRIPTION ENCOUNTER (OUTPATIENT)
Age: 27
End: 2022-11-07

## 2022-11-17 ENCOUNTER — APPOINTMENT (OUTPATIENT)
Dept: NEUROLOGY | Facility: CLINIC | Age: 27
End: 2022-11-17

## 2022-11-17 VITALS
WEIGHT: 124 LBS | DIASTOLIC BLOOD PRESSURE: 70 MMHG | HEART RATE: 83 BPM | HEIGHT: 63 IN | BODY MASS INDEX: 21.97 KG/M2 | OXYGEN SATURATION: 97 % | SYSTOLIC BLOOD PRESSURE: 117 MMHG | RESPIRATION RATE: 16 BRPM

## 2022-11-17 PROCEDURE — 99213 OFFICE O/P EST LOW 20 MIN: CPT

## 2022-11-17 RX ORDER — LAMOTRIGINE 200 MG/1
200 TABLET ORAL TWICE DAILY
Qty: 180 | Refills: 1 | Status: DISCONTINUED | COMMUNITY
Start: 2022-07-13 | End: 2022-11-17

## 2022-11-21 ENCOUNTER — TRANSCRIPTION ENCOUNTER (OUTPATIENT)
Age: 27
End: 2022-11-21

## 2022-11-22 ENCOUNTER — TRANSCRIPTION ENCOUNTER (OUTPATIENT)
Age: 27
End: 2022-11-22

## 2022-11-23 ENCOUNTER — TRANSCRIPTION ENCOUNTER (OUTPATIENT)
Age: 27
End: 2022-11-23

## 2023-02-06 ENCOUNTER — EMERGENCY (EMERGENCY)
Facility: HOSPITAL | Age: 28
LOS: 1 days | Discharge: HOME | End: 2023-02-06
Attending: STUDENT IN AN ORGANIZED HEALTH CARE EDUCATION/TRAINING PROGRAM | Admitting: STUDENT IN AN ORGANIZED HEALTH CARE EDUCATION/TRAINING PROGRAM
Payer: MEDICAID

## 2023-02-06 VITALS
TEMPERATURE: 98 F | DIASTOLIC BLOOD PRESSURE: 84 MMHG | OXYGEN SATURATION: 97 % | WEIGHT: 115.08 LBS | RESPIRATION RATE: 18 BRPM | HEART RATE: 76 BPM | HEIGHT: 63 IN | SYSTOLIC BLOOD PRESSURE: 123 MMHG

## 2023-02-06 PROCEDURE — 99284 EMERGENCY DEPT VISIT MOD MDM: CPT

## 2023-02-06 PROCEDURE — 99285 EMERGENCY DEPT VISIT HI MDM: CPT

## 2023-02-06 NOTE — ED PROVIDER NOTE - OBJECTIVE STATEMENT
27f denies [facility] bibems for ams. patient admits to drinking tequila. denies drug use. denies si/hi. states she just wants to cry and go home.

## 2023-02-06 NOTE — ED ADULT TRIAGE NOTE - ARRIVAL INFO ADDITIONAL COMMENTS
Pt to ED c/o AMS. Pt confesses dirking tequila shots. Denies N/V/D, nor dizziness. A&OX3 at this time.

## 2023-02-06 NOTE — ED PROVIDER NOTE - PATIENT PORTAL LINK FT
You can access the FollowMyHealth Patient Portal offered by [facility] by registering at the following website: [email or web address]. By joining [employer]’s FollowMyHealth portal, you will also be able to view your health information using other applications (apps) compatible with our system.

## 2023-02-06 NOTE — ED ADULT NURSE NOTE - CAS EDN INTEG ASSESS
- - - Detail Level: Simple Render Risk Assessment In Note?: yes Additional Notes: Patient consent was obtained to proceed with the visit and recommended plan of care after discussion of all risks and benefits, including the risks of COVID-19 exposure.

## 2023-02-06 NOTE — ED ADULT NURSE NOTE - NSIMPLEMENTINTERV_GEN_ALL_ED
Implemented All Fall Risk Interventions:  [place] to call system. Call bell, personal items and telephone within reach. Instruct patient to call for assistance. Room bathroom lighting operational. Non-slip footwear when patient is off stretcher. Physically safe environment: no spills, clutter or unnecessary equipment. Stretcher in lowest position, wheels locked, appropriate side rails in place. Provide visual cue, wrist band, yellow gown, etc. Monitor gait and stability. Monitor for mental status changes and reorient to person, place, and time. Review medications for side effects contributing to fall risk. Reinforce activity limits and safety measures with patient and family.

## 2023-02-06 NOTE — ED PROVIDER NOTE - CLINICAL SUMMARY MEDICAL DECISION MAKING FREE TEXT BOX
Patient presenting intoxicated appearing and and smelling of alcohol. They are hemodynamically stable and afebrile.  No signs of significant trauma or other etiology of altered mental status on initial physical exam. demonstrating ability to ambulate safely, tolerate oral intake, articulate a safe discharge plan. will dc

## 2023-02-09 DIAGNOSIS — R41.82 ALTERED MENTAL STATUS, UNSPECIFIED: ICD-10-CM

## 2023-02-09 DIAGNOSIS — F10.129 ALCOHOL ABUSE WITH INTOXICATION, UNSPECIFIED: ICD-10-CM

## 2023-04-13 ENCOUNTER — APPOINTMENT (OUTPATIENT)
Dept: NEUROLOGY | Facility: CLINIC | Age: 28
End: 2023-04-13

## 2023-04-14 ENCOUNTER — TRANSCRIPTION ENCOUNTER (OUTPATIENT)
Age: 28
End: 2023-04-14

## 2023-04-17 ENCOUNTER — TRANSCRIPTION ENCOUNTER (OUTPATIENT)
Age: 28
End: 2023-04-17

## 2023-04-20 ENCOUNTER — APPOINTMENT (OUTPATIENT)
Dept: NEUROLOGY | Facility: CLINIC | Age: 28
End: 2023-04-20

## 2023-04-26 ENCOUNTER — TRANSCRIPTION ENCOUNTER (OUTPATIENT)
Age: 28
End: 2023-04-26

## 2023-04-28 ENCOUNTER — TRANSCRIPTION ENCOUNTER (OUTPATIENT)
Age: 28
End: 2023-04-28

## 2023-05-15 ENCOUNTER — TRANSCRIPTION ENCOUNTER (OUTPATIENT)
Age: 28
End: 2023-05-15

## 2023-05-16 ENCOUNTER — TRANSCRIPTION ENCOUNTER (OUTPATIENT)
Age: 28
End: 2023-05-16

## 2023-05-23 ENCOUNTER — APPOINTMENT (OUTPATIENT)
Dept: NEUROLOGY | Facility: CLINIC | Age: 28
End: 2023-05-23
Payer: MEDICAID

## 2023-05-23 VITALS
SYSTOLIC BLOOD PRESSURE: 121 MMHG | TEMPERATURE: 97.5 F | WEIGHT: 125 LBS | OXYGEN SATURATION: 95 % | DIASTOLIC BLOOD PRESSURE: 75 MMHG | HEART RATE: 79 BPM | BODY MASS INDEX: 22.15 KG/M2 | HEIGHT: 63 IN

## 2023-05-23 PROCEDURE — 99214 OFFICE O/P EST MOD 30 MIN: CPT

## 2023-05-24 LAB
ALBUMIN SERPL ELPH-MCNC: 4.6 G/DL
ALP BLD-CCNC: 68 U/L
ALT SERPL-CCNC: 14 U/L
ANION GAP SERPL CALC-SCNC: 12 MMOL/L
AST SERPL-CCNC: 25 U/L
BILIRUB SERPL-MCNC: 0.5 MG/DL
BUN SERPL-MCNC: 15 MG/DL
CALCIUM SERPL-MCNC: 9.9 MG/DL
CHLORIDE SERPL-SCNC: 106 MMOL/L
CO2 SERPL-SCNC: 24 MMOL/L
CREAT SERPL-MCNC: 0.64 MG/DL
EGFR: 122 ML/MIN/1.73M2
GLUCOSE SERPL-MCNC: 108 MG/DL
POTASSIUM SERPL-SCNC: 4.1 MMOL/L
PROT SERPL-MCNC: 7.3 G/DL
SODIUM SERPL-SCNC: 143 MMOL/L

## 2023-05-26 LAB — LEVETIRACETAM SERPL-MCNC: 24.5 UG/ML

## 2023-05-31 LAB — LAMOTRIGINE SERPL-MCNC: 7.6 UG/ML

## 2023-06-01 ENCOUNTER — TRANSCRIPTION ENCOUNTER (OUTPATIENT)
Age: 28
End: 2023-06-01

## 2023-06-02 ENCOUNTER — TRANSCRIPTION ENCOUNTER (OUTPATIENT)
Age: 28
End: 2023-06-02

## 2023-06-06 ENCOUNTER — TRANSCRIPTION ENCOUNTER (OUTPATIENT)
Age: 28
End: 2023-06-06

## 2023-06-07 ENCOUNTER — TRANSCRIPTION ENCOUNTER (OUTPATIENT)
Age: 28
End: 2023-06-07

## 2023-06-09 ENCOUNTER — TRANSCRIPTION ENCOUNTER (OUTPATIENT)
Age: 28
End: 2023-06-09

## 2023-06-13 ENCOUNTER — APPOINTMENT (OUTPATIENT)
Dept: NEUROLOGY | Facility: CLINIC | Age: 28
End: 2023-06-13
Payer: MEDICAID

## 2023-06-13 PROCEDURE — 95816 EEG AWAKE AND DROWSY: CPT

## 2023-06-14 ENCOUNTER — TRANSCRIPTION ENCOUNTER (OUTPATIENT)
Age: 28
End: 2023-06-14

## 2023-06-14 PROCEDURE — 95708 EEG WO VID EA 12-26HR UNMNTR: CPT

## 2023-06-15 ENCOUNTER — APPOINTMENT (OUTPATIENT)
Dept: NEUROLOGY | Facility: CLINIC | Age: 28
End: 2023-06-15

## 2023-06-15 PROCEDURE — 95708 EEG WO VID EA 12-26HR UNMNTR: CPT

## 2023-06-15 PROCEDURE — 95700 EEG CONT REC W/VID EEG TECH: CPT

## 2023-06-15 PROCEDURE — 95721 EEG PHY/QHP>36<60 HR W/O VID: CPT

## 2023-07-12 ENCOUNTER — TRANSCRIPTION ENCOUNTER (OUTPATIENT)
Age: 28
End: 2023-07-12

## 2023-07-17 ENCOUNTER — TRANSCRIPTION ENCOUNTER (OUTPATIENT)
Age: 28
End: 2023-07-17

## 2023-07-24 ENCOUNTER — TRANSCRIPTION ENCOUNTER (OUTPATIENT)
Age: 28
End: 2023-07-24

## 2023-07-31 ENCOUNTER — APPOINTMENT (OUTPATIENT)
Dept: NEUROLOGY | Facility: CLINIC | Age: 28
End: 2023-07-31
Payer: MEDICAID

## 2023-07-31 VITALS
TEMPERATURE: 98 F | BODY MASS INDEX: 21.09 KG/M2 | SYSTOLIC BLOOD PRESSURE: 122 MMHG | HEART RATE: 98 BPM | HEIGHT: 63 IN | OXYGEN SATURATION: 97 % | DIASTOLIC BLOOD PRESSURE: 74 MMHG | WEIGHT: 119 LBS

## 2023-07-31 PROCEDURE — 99214 OFFICE O/P EST MOD 30 MIN: CPT

## 2023-07-31 RX ORDER — MIDAZOLAM HYDROCHLORIDE 5 MG/ML
5 INJECTION, SOLUTION INTRAMUSCULAR; INTRAVENOUS
Qty: 30 | Refills: 0 | Status: DISCONTINUED | COMMUNITY
Start: 2023-05-26 | End: 2023-07-31

## 2023-08-11 ENCOUNTER — APPOINTMENT (OUTPATIENT)
Dept: NEUROLOGY | Facility: CLINIC | Age: 28
End: 2023-08-11
Payer: MEDICAID

## 2023-08-11 PROCEDURE — 95819 EEG AWAKE AND ASLEEP: CPT

## 2023-08-12 PROCEDURE — 95708 EEG WO VID EA 12-26HR UNMNTR: CPT

## 2023-08-13 PROCEDURE — 95708 EEG WO VID EA 12-26HR UNMNTR: CPT

## 2023-08-13 PROCEDURE — 95700 EEG CONT REC W/VID EEG TECH: CPT

## 2023-08-13 PROCEDURE — 95721 EEG PHY/QHP>36<60 HR W/O VID: CPT

## 2023-08-14 ENCOUNTER — APPOINTMENT (OUTPATIENT)
Dept: NEUROLOGY | Facility: CLINIC | Age: 28
End: 2023-08-14
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
## 2023-08-28 NOTE — PHYSICAL EXAM
[General Appearance - Alert] : alert [General Appearance - In No Acute Distress] : in no acute distress [Oriented To Time, Place, And Person] : oriented to person, place, and time [Person] : oriented to person [Place] : oriented to place [Time] : oriented to time [Fluency] : fluency intact [Cranial Nerves Optic (II)] : visual acuity intact bilaterally,  visual fields full to confrontation, pupils equal round and reactive to light [Cranial Nerves Oculomotor (III)] : extraocular motion intact [Cranial Nerves Trigeminal (V)] : facial sensation intact symmetrically [Cranial Nerves Facial (VII)] : face symmetrical [Cranial Nerves Vestibulocochlear (VIII)] : hearing was intact bilaterally [Cranial Nerves Glossopharyngeal (IX)] : tongue and palate midline [Cranial Nerves Accessory (XI - Cranial And Spinal)] : head turning and shoulder shrug symmetric [Cranial Nerves Hypoglossal (XII)] : there was no tongue deviation with protrusion [Motor Tone] : muscle tone was normal in all four extremities [Motor Strength] : muscle strength was normal in all four extremities [Sensation Tactile Decrease] : light touch was intact [Balance] : balance was intact [Abnormal Walk] : normal gait [Coordination - Dysmetria Impaired Finger-to-Nose Bilateral] : not present

## 2023-08-28 NOTE — DISCUSSION/SUMMARY
[FreeTextEntry1] : 29 yo F presents as hospital follow up for suspected focal epilepsy MRI brain 6/10/2022 Left temporal lobe choroidal fissure cyst.   Plan: Increase LEV to 1 g BID Continue LTG XR 300mg BID  Levels today Continue folic acid 1 mg/daily 48HR Ambulatory EEG Repeat MRI brain w/wout contrast Reviewed seizure safety precautions  Klonipin 0.25 prn aura F/u in 2-3 months

## 2023-08-28 NOTE — DATA REVIEWED
[de-identified] : EXAM: [id number] - MR BRAIN SEIZURE EP WAW IC 3D#  - ORDERED BY: ESTER ADAMES\par  \par  \par  PROCEDURE DATE:  06/07/2022\par  \par  \par  \par  INTERPRETATION:  PROCEDURE: MRI Brain with and without contrast\par  \par  INDICATION: Seizure\par  \par  TECHNIQUE: Sagittal T1, axial T2, FLAIR, diffusion, 3D SWAN and coronal FLAIR images of the brain were obtained. Coronal FLAIR and T2 images of the brain were obtained through the temporal lobes. Following the intravenous administration of 6.5 Gadavist axial T1-weighted images and sagittal 3D BRAVO images were obtained and reconstructed in the axial and coronal planes.\par  \par  COMPARISON: CT head 4/13/2022\par  \par  FINDINGS: The MRI examination of the brain demonstrates the ventricles, cisternal spaces, and cortical sulci to be appropriate for the patient's stated age. There is no midline shift or extra-axial collection. No abnormal signal is identified within the brain parenchyma. The diffusion-weighted images demonstrate no acute ischemia. The postcontrast images demonstrate no abnormal enhancement. There are no hemorrhagic products. There is normal vascular flow-voids. The visualized paranasal sinuses are free of mucosal disease. The mastoid air cells are well-aerated.\par  \par  Examination of the temporal lobes demonstrates a approximately 5 mm left choroidal fissure cyst. The hippocampus are within normal limits. No abnormal signal is identified within the hippocampus.\par  \par  IMPRESSION: Left choroidal fissure cyst. No hippocampal atrophy or signal abnormality.\par  \par  --- End of Report ---\par  \par  \par  \par  \par  \par  \par  WU RUFF MD; Attending Radiologist\par  This document has been electronically signed. Domingo 10 2022  3:45PM

## 2023-10-18 ENCOUNTER — APPOINTMENT (OUTPATIENT)
Dept: MRI IMAGING | Facility: HOSPITAL | Age: 28
End: 2023-10-18

## 2023-11-01 ENCOUNTER — APPOINTMENT (OUTPATIENT)
Dept: NEUROLOGY | Facility: CLINIC | Age: 28
End: 2023-11-01
Payer: MEDICAID

## 2023-11-01 VITALS
BODY MASS INDEX: 21.26 KG/M2 | SYSTOLIC BLOOD PRESSURE: 112 MMHG | HEART RATE: 112 BPM | WEIGHT: 120 LBS | HEIGHT: 63 IN | OXYGEN SATURATION: 96 % | TEMPERATURE: 98.1 F | DIASTOLIC BLOOD PRESSURE: 70 MMHG

## 2023-11-01 DIAGNOSIS — F17.200 NICOTINE DEPENDENCE, UNSPECIFIED, UNCOMPLICATED: ICD-10-CM

## 2023-11-01 DIAGNOSIS — F19.91 OTHER PSYCHOACTIVE SUBSTANCE USE, UNSPECIFIED, IN REMISSION: ICD-10-CM

## 2023-11-01 PROCEDURE — 99214 OFFICE O/P EST MOD 30 MIN: CPT

## 2023-11-01 RX ORDER — ERGOCALCIFEROL 1.25 MG/1
1.25 MG CAPSULE, LIQUID FILLED ORAL
Qty: 12 | Refills: 0 | Status: ACTIVE | COMMUNITY
Start: 2023-06-20

## 2023-11-01 RX ORDER — DOXYCYCLINE HYCLATE 100 MG/1
100 CAPSULE ORAL
Qty: 14 | Refills: 0 | Status: COMPLETED | COMMUNITY
Start: 2023-06-16

## 2023-11-01 RX ORDER — SULFAMETHOXAZOLE AND TRIMETHOPRIM 800; 160 MG/1; MG/1
800-160 TABLET ORAL
Qty: 10 | Refills: 0 | Status: COMPLETED | COMMUNITY
Start: 2023-09-05

## 2023-11-01 RX ORDER — METRONIDAZOLE 7.5 MG/G
0.75 GEL VAGINAL
Qty: 70 | Refills: 0 | Status: COMPLETED | COMMUNITY
Start: 2023-06-09

## 2023-11-01 RX ORDER — FLUTICASONE PROPIONATE 50 UG/1
50 SPRAY, METERED NASAL
Qty: 16 | Refills: 0 | Status: DISCONTINUED | COMMUNITY
Start: 2023-07-11

## 2023-11-01 RX ORDER — SPIRONOLACTONE 50 MG/1
50 TABLET ORAL
Qty: 60 | Refills: 0 | Status: ACTIVE | COMMUNITY
Start: 2023-06-20

## 2023-11-01 RX ORDER — CETIRIZINE HYDROCHLORIDE 10 MG/1
10 TABLET, COATED ORAL
Qty: 30 | Refills: 0 | Status: DISCONTINUED | COMMUNITY
Start: 2023-07-11

## 2023-11-06 LAB — LEVETIRACETAM SERPL-MCNC: 45.6 UG/ML

## 2023-11-07 LAB — LAMOTRIGINE SERPL-MCNC: 10.1 UG/ML

## 2023-12-11 RX ORDER — LEVETIRACETAM 1000 MG/1
1000 TABLET, FILM COATED ORAL TWICE DAILY
Qty: 60 | Refills: 6 | Status: ACTIVE | COMMUNITY
Start: 1900-01-01 | End: 1900-01-01

## 2023-12-11 RX ORDER — LAMOTRIGINE 300 MG/1
300 TABLET, EXTENDED RELEASE ORAL
Qty: 60 | Refills: 6 | Status: ACTIVE | COMMUNITY
Start: 2022-10-20 | End: 1900-01-01

## 2023-12-11 RX ORDER — LEVETIRACETAM 250 MG/1
250 TABLET, FILM COATED ORAL TWICE DAILY
Qty: 60 | Refills: 6 | Status: ACTIVE | COMMUNITY
Start: 2023-11-01 | End: 1900-01-01

## 2023-12-21 ENCOUNTER — APPOINTMENT (OUTPATIENT)
Dept: NEUROLOGY | Facility: CLINIC | Age: 28
End: 2023-12-21
Payer: COMMERCIAL

## 2023-12-21 VITALS
DIASTOLIC BLOOD PRESSURE: 69 MMHG | OXYGEN SATURATION: 96 % | SYSTOLIC BLOOD PRESSURE: 114 MMHG | HEIGHT: 63 IN | BODY MASS INDEX: 21.44 KG/M2 | TEMPERATURE: 98.1 F | HEART RATE: 89 BPM | WEIGHT: 121 LBS

## 2023-12-21 PROCEDURE — 99214 OFFICE O/P EST MOD 30 MIN: CPT
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
## 2023-12-22 NOTE — PHYSICAL EXAM
[FreeTextEntry1] : MS: Awake, alert, oriented to person, place, situation and time. Follows commands.   Language: Speech is clear, fluent. No dysarthria.   CNs 2 - 12 intact. EOMI no nystagmus, no diplopia. No facial asymmetry b/l. Tongue midline, normal movements, no atrophy.   Motor: Normal muscle bulk & tone. No noticeable tremor or seizure. No pronator drift. Muscle strength of b/l UE and b/l LE 5/5.   Sensation: Intact to LT b/l throughout  Cortical: No extinction  Coordination: Intact rapid-alternating movements. No dysmetria to finger to nose.   DTR: 2+ in biceps, brachioradialis and triceps; 2+ in patellar and ankle; plantars are down b/l  Gait: No postural instability. Normal stance and tandem gait.

## 2023-12-22 NOTE — DISCUSSION/SUMMARY
[FreeTextEntry1] : 28y old female presents for follow up of focal epilepsy MRI brain 6/10/2022 Left temporal lobe choroidal fissure cyst. Last GTC 7/14/2023, continues to have focal seizures  Plan: Increase LEV to 1500mg BID Continue Lamotrigine  mg bid  Clonazepam 0.5 mg prn seizure MRI brain w/wout contrast reordered Reviewed seizure precautions, no driving F/u in 3-4 months

## 2024-01-23 ENCOUNTER — TRANSCRIPTION ENCOUNTER (OUTPATIENT)
Age: 29
End: 2024-01-23

## 2024-01-24 ENCOUNTER — TRANSCRIPTION ENCOUNTER (OUTPATIENT)
Age: 29
End: 2024-01-24

## 2024-01-24 RX ORDER — LEVETIRACETAM 750 MG/1
750 TABLET, FILM COATED ORAL
Qty: 180 | Refills: 5 | Status: ACTIVE | COMMUNITY
Start: 2023-12-21 | End: 1900-01-01

## 2024-01-25 ENCOUNTER — TRANSCRIPTION ENCOUNTER (OUTPATIENT)
Age: 29
End: 2024-01-25

## 2024-01-31 ENCOUNTER — RX RENEWAL (OUTPATIENT)
Age: 29
End: 2024-01-31

## 2024-01-31 RX ORDER — FOLIC ACID 1 MG/1
1 TABLET ORAL DAILY
Qty: 30 | Refills: 7 | Status: ACTIVE | COMMUNITY
Start: 2022-11-17 | End: 1900-01-01

## 2024-05-21 ENCOUNTER — TRANSCRIPTION ENCOUNTER (OUTPATIENT)
Age: 29
End: 2024-05-21

## 2024-05-29 ENCOUNTER — TRANSCRIPTION ENCOUNTER (OUTPATIENT)
Age: 29
End: 2024-05-29

## 2024-06-01 ENCOUNTER — TRANSCRIPTION ENCOUNTER (OUTPATIENT)
Age: 29
End: 2024-06-01

## 2024-06-06 RX ORDER — LAMOTRIGINE 100 MG/1
100 TABLET, EXTENDED RELEASE ORAL
Qty: 90 | Refills: 0 | Status: ACTIVE | COMMUNITY
Start: 2024-06-06 | End: 1900-01-01

## 2024-06-11 ENCOUNTER — APPOINTMENT (OUTPATIENT)
Dept: NEUROLOGY | Facility: CLINIC | Age: 29
End: 2024-06-11
Payer: COMMERCIAL

## 2024-06-11 PROCEDURE — 95816 EEG AWAKE AND DROWSY: CPT

## 2024-06-12 PROCEDURE — 95708 EEG WO VID EA 12-26HR UNMNTR: CPT

## 2024-06-13 ENCOUNTER — APPOINTMENT (OUTPATIENT)
Dept: NEUROLOGY | Facility: CLINIC | Age: 29
End: 2024-06-13

## 2024-06-13 PROCEDURE — 95700 EEG CONT REC W/VID EEG TECH: CPT

## 2024-06-13 PROCEDURE — 95723 EEG PHY/QHP>60<84 HR W/O VID: CPT

## 2024-06-13 PROCEDURE — 95708 EEG WO VID EA 12-26HR UNMNTR: CPT

## 2024-06-19 ENCOUNTER — APPOINTMENT (OUTPATIENT)
Dept: NEUROLOGY | Facility: CLINIC | Age: 29
End: 2024-06-19
Payer: COMMERCIAL

## 2024-06-19 VITALS
DIASTOLIC BLOOD PRESSURE: 61 MMHG | HEIGHT: 63 IN | OXYGEN SATURATION: 98 % | TEMPERATURE: 97.8 F | HEART RATE: 74 BPM | WEIGHT: 120 LBS | BODY MASS INDEX: 21.26 KG/M2 | SYSTOLIC BLOOD PRESSURE: 122 MMHG

## 2024-06-19 DIAGNOSIS — G40.109 LOCALIZATION-RELATED (FOCAL) (PARTIAL) SYMPTOMATIC EPILEPSY AND EPILEPTIC SYNDROMES WITH SIMPLE PARTIAL SEIZURES, NOT INTRACTABLE, W/OUT STATUS EPILEPTICUS: ICD-10-CM

## 2024-06-19 PROCEDURE — G2211 COMPLEX E/M VISIT ADD ON: CPT

## 2024-06-19 PROCEDURE — 99214 OFFICE O/P EST MOD 30 MIN: CPT

## 2024-06-19 RX ORDER — CLONAZEPAM 1 MG/1
1 TABLET, ORALLY DISINTEGRATING ORAL
Qty: 10 | Refills: 0 | Status: ACTIVE | COMMUNITY
Start: 2023-06-09 | End: 1900-01-01
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
## 2024-06-19 NOTE — DISCUSSION/SUMMARY
[FreeTextEntry1] : 29y old female presents for follow up of focal epilepsy MRI brain 6/10/2022 Left temporal lobe choroidal fissure cyst. Last GTC 7/14/2023, continues to have focal seizures 6/11-6/13/2024 AEEG - Occasional left frontal/frontotemporal epileptiform discharges in sleep. Typical auras reported with no epileptiform changes seen.  Plan: Continue LEV to 1500mg BID and Lamotrigine /300 Labs today  Clonazepam 1 mg prn seizure MRI brain w/wout contrast reordered again Neuropsych testing If auras continue will plan for EMU admission Discussed may potentially by surgical candidate Reviewed seizure precautions, no driving F/u in 2-3 months

## 2024-06-19 NOTE — PHYSICAL EXAM
[General Appearance - Alert] : alert [General Appearance - In No Acute Distress] : in no acute distress [Oriented To Time, Place, And Person] : oriented to person, place, and time [Person] : oriented to person [Place] : oriented to place [Time] : oriented to time [Fluency] : fluency intact [Cranial Nerves Optic (II)] : visual acuity intact bilaterally,  visual fields full to confrontation, pupils equal round and reactive to light [Cranial Nerves Oculomotor (III)] : extraocular motion intact [Cranial Nerves Trigeminal (V)] : facial sensation intact symmetrically [Cranial Nerves Facial (VII)] : face symmetrical [Cranial Nerves Vestibulocochlear (VIII)] : hearing was intact bilaterally [Cranial Nerves Glossopharyngeal (IX)] : tongue and palate midline [Cranial Nerves Accessory (XI - Cranial And Spinal)] : head turning and shoulder shrug symmetric [Cranial Nerves Hypoglossal (XII)] : there was no tongue deviation with protrusion [Motor Tone] : muscle tone was normal in all four extremities [Motor Strength] : muscle strength was normal in all four extremities [Sensation Tactile Decrease] : light touch was intact [Abnormal Walk] : normal gait [Balance] : balance was intact [Coordination - Dysmetria Impaired Finger-to-Nose Bilateral] : not present

## 2024-06-20 LAB
ALBUMIN SERPL ELPH-MCNC: 4.7 G/DL
ALP BLD-CCNC: 65 U/L
ALT SERPL-CCNC: 17 U/L
ANION GAP SERPL CALC-SCNC: 11 MMOL/L
AST SERPL-CCNC: 17 U/L
BILIRUB SERPL-MCNC: 0.5 MG/DL
BUN SERPL-MCNC: 10 MG/DL
CALCIUM SERPL-MCNC: 9.8 MG/DL
CHLORIDE SERPL-SCNC: 102 MMOL/L
CO2 SERPL-SCNC: 25 MMOL/L
CREAT SERPL-MCNC: 0.71 MG/DL
EGFR: 117 ML/MIN/1.73M2
GLUCOSE SERPL-MCNC: 80 MG/DL
HCT VFR BLD CALC: 41.7 %
HGB BLD-MCNC: 13.2 G/DL
MCHC RBC-ENTMCNC: 28.5 PG
MCHC RBC-ENTMCNC: 31.7 GM/DL
MCV RBC AUTO: 90.1 FL
PLATELET # BLD AUTO: 254 K/UL
POTASSIUM SERPL-SCNC: 4.3 MMOL/L
PROT SERPL-MCNC: 7.1 G/DL
RBC # BLD: 4.63 M/UL
RBC # FLD: 14.4 %
SODIUM SERPL-SCNC: 138 MMOL/L
WBC # FLD AUTO: 5.36 K/UL

## 2024-06-24 LAB — LAMOTRIGINE SERPL-MCNC: 14.2 UG/ML

## 2024-06-25 LAB — LEVETIRACETAM SERPL-MCNC: 14.3 UG/ML

## 2024-07-18 ENCOUNTER — TRANSCRIPTION ENCOUNTER (OUTPATIENT)
Age: 29
End: 2024-07-18

## 2024-08-06 ENCOUNTER — TRANSCRIPTION ENCOUNTER (OUTPATIENT)
Age: 29
End: 2024-08-06

## 2024-09-09 ENCOUNTER — NON-APPOINTMENT (OUTPATIENT)
Age: 29
End: 2024-09-09

## 2024-09-09 ENCOUNTER — APPOINTMENT (OUTPATIENT)
Dept: NEUROLOGY | Facility: CLINIC | Age: 29
End: 2024-09-09

## 2024-09-09 NOTE — HISTORY OF PRESENT ILLNESS
[FreeTextEntry1] : 9/9/24 HPI: Ann is a 29 year old female presenting for a follow up visit for seizures. Last seen by Dr. Ward on 6/19/24.   MRI brain w/wo -  Neuropsych -  If auras continue, EMU admission -    Meds: Keppra 1500mg BID (Level 14.3) Lamotrigine ER 400mg / 300mg (level 14.2) Clonazepam 1mg prn  ---------------------------- Prior: Patient had reported increasing auras (feeling a dejavu and rising sensation in stomach), last month was having one every 1-2 days and some days clustering LTG ER was increased by 100 mf to 400/300 on 6/8, 2 auras after (last on 6/14). Has been having vivid dreams  On 3/18 after traveling back to [place] from visiting her family in [place] she was talking to her roommate about work/stress, felt tingling sensation through and nauseas. tried to grab door and it slipped out of hand, she fell and was shaking but I was not unconscious for. She was able to tell her roommate she was having a seizure and he caught her before her whole body went down. Different than typical aura or seizures (likely anxiety or panic attack).  6/11-6/13/2024 AEEG Occasional left frontal/frontotemporal epileptiform discharges in sleep Patient reported several Events of aura: 6/12/2024: 17:06 patient reported "Mini seizure. Been in this moment, repeat." 6/12/2024 17:29 noted "Same, Kandis vu, I missed something but I'm just watching a movie" 6/13/2024 2:24 noted "Dreaming or awake? Was trying to go to sleep" 6/13/2024 10:00, noted "Aura" None of the above events were associated with electrographic seizure correlate during these times.

## 2024-09-10 ENCOUNTER — NON-APPOINTMENT (OUTPATIENT)
Age: 29
End: 2024-09-10

## 2024-09-10 ENCOUNTER — APPOINTMENT (OUTPATIENT)
Dept: NEUROLOGY | Facility: CLINIC | Age: 29
End: 2024-09-10
Payer: COMMERCIAL

## 2024-09-10 VITALS
DIASTOLIC BLOOD PRESSURE: 75 MMHG | WEIGHT: 115 LBS | BODY MASS INDEX: 20.38 KG/M2 | HEART RATE: 75 BPM | SYSTOLIC BLOOD PRESSURE: 108 MMHG | HEIGHT: 63 IN | OXYGEN SATURATION: 86 %

## 2024-09-10 DIAGNOSIS — G40.109 LOCALIZATION-RELATED (FOCAL) (PARTIAL) SYMPTOMATIC EPILEPSY AND EPILEPTIC SYNDROMES WITH SIMPLE PARTIAL SEIZURES, NOT INTRACTABLE, W/OUT STATUS EPILEPTICUS: ICD-10-CM

## 2024-09-10 PROCEDURE — 99214 OFFICE O/P EST MOD 30 MIN: CPT

## 2024-09-10 NOTE — PHYSICAL EXAM
[FreeTextEntry1] : General: Constitutional: Sitting comfortably in NAD. Psychiatric: well-groomed, appropriate affect Ears, Nose, Throat: no abnormalities, mucus membranes moist Neck: supple Extremities: no edema, clubbing or cyanosis Skin: no rash or neuro-cutaneous signs  Cognitive: Orientation, language, memory and knowledge screens intact.  Cranial Nerves: II: YOLI. III/IV/VI: EOM Full. VII: Face appears symmetric. VIII: Normal to screening. IX/X: Normal phonation. XI: Trapezius Symmetric. XII: Tongue midline.  Motor: Subtle upper extremity tremor  Power: No pronator drift.  Normal gait.

## 2024-09-10 NOTE — HISTORY OF PRESENT ILLNESS
[FreeTextEntry1] : 9/10/24 HPI: Ann is a 29 year old female presenting for a follow up visit for seizure. Last seen by Dr. Ward on 6/19/24.  Aura-free for several months, but started to return last weekend. 9/7 when waking up 9/8 when waking up 9/10 in the AM while putting on makeup (took Clonazepam)  Missed an entire day of medications last week, possibly on 9/3 or 9/4.  Feeling much more dizzy and tired since increasing Lamotrigine several months ago.   Meds: Keppra 1500mg BID (level 14.3) Lamotrigine 400mg / 300mg (level 14.2)  ---------------------------- Prior: Patient had reported increasing auras (feeling a dejavu and rising sensation in stomach), last month was having one every 1-2 days and some days clustering LTG ER was increased by 100 mf to 400/300 on 6/8, 2 auras after (last on 6/14). Has been having vivid dreams  On 3/18 after traveling back to [place] from visiting her family in [place] she was talking to her roommate about work/stress, felt tingling sensation through and nauseas. tried to grab door and it slipped out of hand, she fell and was shaking but I was not unconscious for. She was able to tell her roommate she was having a seizure and he caught her before her whole body went down. Different than typical aura or seizures (likely anxiety or panic attack).  6/11-6/13/2024 AEEG Occasional left frontal/frontotemporal epileptiform discharges in sleep Patient reported several Events of aura: 6/12/2024: 17:06 patient reported "Mini seizure. Been in this moment, repeat." 6/12/2024 17:29 noted "Same, Kandis vu, I missed something but I'm just watching a movie" 6/13/2024 2:24 noted "Dreaming or awake? Was trying to go to sleep" 6/13/2024 10:00, noted "Aura" None of the above events were associated with electrographic seizure correlate during these times.

## 2024-09-10 NOTE — REVIEW OF SYSTEMS
[As Noted in HPI] : as noted in HPI [Chest Pain] : no chest pain [Shortness Of Breath] : no shortness of breath [Abdominal Pain] : no abdominal pain

## 2024-09-10 NOTE — DISCUSSION/SUMMARY
[FreeTextEntry1] : Impression: 1) Seizures - history of L temporal focal epilepsy. Last GTC 7/14/23, but continues to experience auras. Somewhat improved after increasing LTG, but missed a day of meds last week and has experienced 3 events since. Likely maxed out on LTG with level of 14.2 and worsening dizziness   Plan: 1) Continue pre-surgical workup - MRI w/wo, neuropsych  2) EMU admission given recurrent auras and AED side effects

## 2024-09-18 ENCOUNTER — OUTPATIENT (OUTPATIENT)
Dept: OUTPATIENT SERVICES | Facility: HOSPITAL | Age: 29
LOS: 1 days | End: 2024-09-18

## 2024-09-18 ENCOUNTER — APPOINTMENT (OUTPATIENT)
Dept: MRI IMAGING | Facility: CLINIC | Age: 29
End: 2024-09-18
Payer: COMMERCIAL

## 2024-09-18 PROCEDURE — 76377 3D RENDER W/INTRP POSTPROCES: CPT | Mod: 26

## 2024-09-18 PROCEDURE — 70553 MRI BRAIN STEM W/O & W/DYE: CPT | Mod: 26

## 2024-09-26 ENCOUNTER — TRANSCRIPTION ENCOUNTER (OUTPATIENT)
Age: 29
End: 2024-09-26

## 2024-09-30 ENCOUNTER — INPATIENT (INPATIENT)
Facility: HOSPITAL | Age: 29
LOS: 7 days | Discharge: HOME | End: 2024-10-08
Attending: PSYCHIATRY & NEUROLOGY | Admitting: PSYCHIATRY & NEUROLOGY
Payer: COMMERCIAL

## 2024-09-30 VITALS
TEMPERATURE: 98 F | HEIGHT: 62.5 IN | OXYGEN SATURATION: 96 % | DIASTOLIC BLOOD PRESSURE: 88 MMHG | HEART RATE: 78 BPM | SYSTOLIC BLOOD PRESSURE: 125 MMHG | RESPIRATION RATE: 18 BRPM | WEIGHT: 120.15 LBS

## 2024-09-30 DIAGNOSIS — G40.909 EPILEPSY, UNSPECIFIED, NOT INTRACTABLE, WITHOUT STATUS EPILEPTICUS: ICD-10-CM

## 2024-09-30 DIAGNOSIS — Z29.9 ENCOUNTER FOR PROPHYLACTIC MEASURES, UNSPECIFIED: ICD-10-CM

## 2024-09-30 LAB
ALBUMIN SERPL ELPH-MCNC: 4.9 G/DL — SIGNIFICANT CHANGE UP (ref 3.3–5)
ALP SERPL-CCNC: 69 U/L — SIGNIFICANT CHANGE UP (ref 40–120)
ALT FLD-CCNC: 24 U/L — SIGNIFICANT CHANGE UP (ref 10–45)
ANION GAP SERPL CALC-SCNC: 12 MMOL/L — SIGNIFICANT CHANGE UP (ref 5–17)
APTT BLD: 31 SEC — SIGNIFICANT CHANGE UP (ref 24.5–35.6)
AST SERPL-CCNC: 22 U/L — SIGNIFICANT CHANGE UP (ref 10–40)
BASOPHILS # BLD AUTO: 0.03 K/UL — SIGNIFICANT CHANGE UP (ref 0–0.2)
BASOPHILS NFR BLD AUTO: 0.5 % — SIGNIFICANT CHANGE UP (ref 0–2)
BILIRUB SERPL-MCNC: 0.4 MG/DL — SIGNIFICANT CHANGE UP (ref 0.2–1.2)
BUN SERPL-MCNC: 12 MG/DL — SIGNIFICANT CHANGE UP (ref 7–23)
CALCIUM SERPL-MCNC: 9.7 MG/DL — SIGNIFICANT CHANGE UP (ref 8.4–10.5)
CHLORIDE SERPL-SCNC: 104 MMOL/L — SIGNIFICANT CHANGE UP (ref 96–108)
CO2 SERPL-SCNC: 24 MMOL/L — SIGNIFICANT CHANGE UP (ref 22–31)
CREAT SERPL-MCNC: 0.7 MG/DL — SIGNIFICANT CHANGE UP (ref 0.5–1.3)
EGFR: 119 ML/MIN/1.73M2 — SIGNIFICANT CHANGE UP
EOSINOPHIL # BLD AUTO: 0.13 K/UL — SIGNIFICANT CHANGE UP (ref 0–0.5)
EOSINOPHIL NFR BLD AUTO: 2.1 % — SIGNIFICANT CHANGE UP (ref 0–6)
GLUCOSE SERPL-MCNC: 85 MG/DL — SIGNIFICANT CHANGE UP (ref 70–99)
HCG SERPL-ACNC: <1 MIU/ML — SIGNIFICANT CHANGE UP
HCT VFR BLD CALC: 44.4 % — SIGNIFICANT CHANGE UP (ref 34.5–45)
HGB BLD-MCNC: 14.5 G/DL — SIGNIFICANT CHANGE UP (ref 11.5–15.5)
IMM GRANULOCYTES NFR BLD AUTO: 0.3 % — SIGNIFICANT CHANGE UP (ref 0–0.9)
INR BLD: 0.9 — SIGNIFICANT CHANGE UP (ref 0.85–1.16)
LYMPHOCYTES # BLD AUTO: 1.95 K/UL — SIGNIFICANT CHANGE UP (ref 1–3.3)
LYMPHOCYTES # BLD AUTO: 31.1 % — SIGNIFICANT CHANGE UP (ref 13–44)
MAGNESIUM SERPL-MCNC: 2 MG/DL — SIGNIFICANT CHANGE UP (ref 1.6–2.6)
MCHC RBC-ENTMCNC: 29 PG — SIGNIFICANT CHANGE UP (ref 27–34)
MCHC RBC-ENTMCNC: 32.7 GM/DL — SIGNIFICANT CHANGE UP (ref 32–36)
MCV RBC AUTO: 88.8 FL — SIGNIFICANT CHANGE UP (ref 80–100)
MONOCYTES # BLD AUTO: 0.55 K/UL — SIGNIFICANT CHANGE UP (ref 0–0.9)
MONOCYTES NFR BLD AUTO: 8.8 % — SIGNIFICANT CHANGE UP (ref 2–14)
NEUTROPHILS # BLD AUTO: 3.6 K/UL — SIGNIFICANT CHANGE UP (ref 1.8–7.4)
NEUTROPHILS NFR BLD AUTO: 57.2 % — SIGNIFICANT CHANGE UP (ref 43–77)
NRBC # BLD: 0 /100 WBCS — SIGNIFICANT CHANGE UP (ref 0–0)
PHOSPHATE SERPL-MCNC: 3.1 MG/DL — SIGNIFICANT CHANGE UP (ref 2.5–4.5)
PLATELET # BLD AUTO: 280 K/UL — SIGNIFICANT CHANGE UP (ref 150–400)
POTASSIUM SERPL-MCNC: 4.2 MMOL/L — SIGNIFICANT CHANGE UP (ref 3.5–5.3)
POTASSIUM SERPL-SCNC: 4.2 MMOL/L — SIGNIFICANT CHANGE UP (ref 3.5–5.3)
PROT SERPL-MCNC: 8.1 G/DL — SIGNIFICANT CHANGE UP (ref 6–8.3)
PROTHROM AB SERPL-ACNC: 10.5 SEC — SIGNIFICANT CHANGE UP (ref 9.9–13.4)
RBC # BLD: 5 M/UL — SIGNIFICANT CHANGE UP (ref 3.8–5.2)
RBC # FLD: 13.2 % — SIGNIFICANT CHANGE UP (ref 10.3–14.5)
SODIUM SERPL-SCNC: 140 MMOL/L — SIGNIFICANT CHANGE UP (ref 135–145)
WBC # BLD: 6.28 K/UL — SIGNIFICANT CHANGE UP (ref 3.8–10.5)
WBC # FLD AUTO: 6.28 K/UL — SIGNIFICANT CHANGE UP (ref 3.8–10.5)

## 2024-09-30 PROCEDURE — 93010 ELECTROCARDIOGRAM REPORT: CPT

## 2024-09-30 PROCEDURE — 99223 1ST HOSP IP/OBS HIGH 75: CPT

## 2024-09-30 RX ORDER — ACETAMINOPHEN 325 MG
650 TABLET ORAL EVERY 6 HOURS
Refills: 0 | Status: DISCONTINUED | OUTPATIENT
Start: 2024-09-30 | End: 2024-10-02

## 2024-09-30 RX ORDER — FOLIC ACID 1 MG/1
1 TABLET ORAL
Refills: 0 | DISCHARGE

## 2024-09-30 RX ORDER — LEVETIRACETAM 1000 MG
1500 TABLET ORAL
Refills: 0 | Status: DISCONTINUED | OUTPATIENT
Start: 2024-09-30 | End: 2024-10-01

## 2024-09-30 RX ORDER — LEVETIRACETAM 1000 MG
2 TABLET ORAL
Refills: 0 | DISCHARGE

## 2024-09-30 RX ORDER — INFLUENZA VIRUS VACCINE 15; 15; 15; 15 UG/.5ML; UG/.5ML; UG/.5ML; UG/.5ML
0.5 SUSPENSION INTRAMUSCULAR ONCE
Refills: 0 | Status: DISCONTINUED | OUTPATIENT
Start: 2024-09-30 | End: 2024-10-08

## 2024-09-30 RX ORDER — FOLIC ACID 1 MG/1
1 TABLET ORAL
Refills: 0 | Status: DISCONTINUED | OUTPATIENT
Start: 2024-09-30 | End: 2024-10-08

## 2024-09-30 RX ORDER — LAMOTRIGINE 25 MG/1
300 TABLET ORAL
Refills: 0 | Status: DISCONTINUED | OUTPATIENT
Start: 2024-09-30 | End: 2024-10-01

## 2024-09-30 RX ADMIN — LAMOTRIGINE 300 MILLIGRAM(S): 25 TABLET ORAL at 20:59

## 2024-09-30 RX ADMIN — FOLIC ACID 1 MILLIGRAM(S): 1 TABLET ORAL at 21:00

## 2024-09-30 RX ADMIN — Medication 1500 MILLIGRAM(S): at 20:59

## 2024-09-30 NOTE — PATIENT PROFILE ADULT - NSPROHMSYMPCOND_GEN_A_NUR
having increased auras, pharmacy isn't filling all doses of meds on time so shes missing some/neurologic

## 2024-09-30 NOTE — H&P ADULT - PROBLEM SELECTOR PLAN 1
1. Admit to EMU  2. VEEG monitoring for 48-72 hours  3. Ativan 2mg IVP PRN for seizures > 2mins  4. Labs including Keppra and Lamotrigine levels  5. Continue Keppra 1500mg BID and Lamotrigine 400/300mg  6. Neurological assessment Q8hrs  7. Maintain Seizure/Fall/Aspiration precautions

## 2024-09-30 NOTE — H&P ADULT - NSHPLABSRESULTS_GEN_ALL_CORE
MR Brain-Seizure, Epilepsy w/wo IV Cont (09.18.24 @ 15:26)     IMPRESSION:  1. 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    --- End of Report ---        EEG Report  Day 1: 4/13/2022 @ 7:56 PM to next morning @ 7:00 AM  Background: continuous, with predominantly alpha and beta frequencies.  Symmetry:  No persistent asymmetries of voltage or frequency.  Posterior Dominant Rhythm:  11 Hz symmetric, well-organized, and poorly-modulated.  Organization: Normal anterior to posterior gradient.  Voltage:  Normal (20+ uV)  Variability: Yes. 		Reactivity: Yes.  N2 sleep: Symmetric, synchronous spindles and K complexes.  Spontaneous Activity:  Occasional (1+/hr <1/min) left temporal spike waves with phase reversal at T3, present in N2 and slow wave sleep.   Periodic/rhythmic activity:  None.  Events:  No electrographic seizures or significant clinical events.  Provocations:  Hyperventilation and Photic stimulation: was not performed.    Daily Summary:    Occasional sleep-activated epileptiform discharges over the left temporal region. No electrographic seizures recorded.             LABS:                        14.5   6.28  )-----------( 280      ( 30 Sep 2024 16:00 )             44.4     09-30    140  |  104  |  12  ----------------------------<  85  4.2   |  24  |  0.70    Ca    9.7      30 Sep 2024 16:00  Phos  3.1     09-30  Mg     2.0     09-30    TPro  8.1  /  Alb  4.9  /  TBili  0.4  /  DBili  x   /  AST  22  /  ALT  24  /  AlkPhos  69  09-30    PT/INR - ( 30 Sep 2024 16:00 )   PT: 10.5 sec;   INR: 0.90          PTT - ( 30 Sep 2024 16:00 )  PTT:31.0 sec      Urinalysis Basic - ( 30 Sep 2024 16:00 )    Color: x / Appearance: x / SG: x / pH: x  Gluc: 85 mg/dL / Ketone: x  / Bili: x / Urobili: x   Blood: x / Protein: x / Nitrite: x   Leuk Esterase: x / RBC: x / WBC x   Sq Epi: x / Non Sq Epi: x / Bacteria: x

## 2024-09-30 NOTE — H&P ADULT - NSICDXFAMILYHX_GEN_ALL_CORE_FT
FAMILY HISTORY:  Father  Still living? Yes, Estimated age: 61-70  Paternal family history of seizure, Age at diagnosis: Age Unknown    Sibling  Still living? Unknown  Maternal family history of seizure disorder, Age at diagnosis: Age Unknown

## 2024-09-30 NOTE — H&P ADULT - NSHPPHYSICALEXAM_GEN_ALL_CORE
General: Young woman, appearance is consistent with chronologic age.   Cognitive/Language:  Awake, alert, and oriented to person, place, time, date and situation.  Recent and remote memory intact. Naming, repetition and comprehension intact. No dysarthria.    Cranial Nerves  - Eyes: Visual acuity intact, Visual fields full.  EOMI w/o nystagmus, skew or reported double vision.  PERRLA. 3mm brisk  No ptosis/weakness of eyelid closure.    - Face: Facial sensation normal V1 - 3, no facial asymmetry.    - Ears/Nose/Throat:  Hearing grossly intact b/l to finger rub.  Palate elevates midline.  Tongue and uvula midline.   Motor exam: Normal tone and bulk. No tenderness, twitching, tremors or involuntary movements.   - MRC grading:          Upper extremity                  Bicep     Tricep     HG                                                 R      5/5        5/5          5/5                                                    L       5/5        5/5          5/5              Lower extremity                   HF        KE        DF         PF                                                  R     5/5       5/5       5/5       5/5                                               L      5/5      5/5       5/5        5/5  Sensory examination:  Intact to light touch in all extremities.  Reflexes: 2+ b/l biceps, triceps, patella and achilles.  Plantar response downgoing b/l.    Cerebellum: Finger To Nose intact.  No dysmetria.    Gait: Unable to do heel to toe, otherwise normal romberg test and     Vital Signs Last 24 Hrs  T(C): 36.7 (30 Sep 2024 14:07), Max: 36.7 (30 Sep 2024 14:07)  T(F): 98 (30 Sep 2024 14:07), Max: 98 (30 Sep 2024 14:07)  HR: 78 (30 Sep 2024 14:07) (78 - 78)  BP: 125/88 (30 Sep 2024 14:07) (125/88 - 125/88)  BP(mean): --  RR: 18 (30 Sep 2024 14:07) (18 - 18)  SpO2: 96% (30 Sep 2024 14:07) (96% - 96%)    Parameters below as of 30 Sep 2024 14:07  Patient On (Oxygen Delivery Method): room air

## 2024-09-30 NOTE — H&P ADULT - PROBLEM SELECTOR PLAN 2
Nutrition & Prophylaxis:    E:  K>4, Mg>2, Phos >3  N: Regular diet  DVT PPx: SCDs  GI PPx: None  Dispo: TBD  Code: FULL CODE Nutrition & Prophylaxis:    E:  K>4, Mg>2, Phos >3  N: Regular diet  DVT PPx: SCDs  GI PPx: None  Dispo: [id number]  Code: FULL CODE

## 2024-09-30 NOTE — H&P ADULT - HISTORY OF PRESENT ILLNESS
29-year-old right-handed Female with no significant PMDHx presents to assess level of seizure frequency while undergoing vEEG monitoring with possible medication adjustment. First onset  29-year-old right-handed Female with PMHx of seizures presents to assess level of seizure frequency while undergoing vEEG monitoring with possible medication adjustment. Patient reports first onset of seizure in January 2022 after reported having multiple auras (feeling of dejavu and heart racing) in 2021 after her Grandmother passed, In April 2022, she had her second seizure and was diagnosed with Epilepsy after brother passed in March 2022. Her most recent seizure was in July 2023 and since then she had multiple auras in 2024 despite being on Keppra 1500mg BID and Lamotrigine 400mg/300mg but Pharmacy was unable to fulfill lamotrigine 100mg so she hasn't been taking increased dose. She is unaware of duration of seizure but is confused immediately and usually comes to when the ambulance/EMS arrives. Reports seizures with fall and convulsion but denies tongue bite, foaming of the mouth or urinary or fecal incontinence. 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 No electrographic seizures recorded." Patient reports Family Hx of seizures in her Maternal Uncle and her father has a h/o convulsing and passing out likely from panic attack. Her triggers include extreme emotions, stress, inconsistent sleep schedule. The patient has no known h/o febrile seizures or CNS infection. Denies any recent illness, travel, HA, dizziness, changes in vision, changes in bowel/bladder function, n/v.      29-year-old right-handed Female with PMHx of seizures presents for a pre-surgical evaluation while undergoing vEEG monitoring. Patient reports multiple episodes of feeling of dejavu and heart racing (auras) in 2021 after her Grandmother passed that she originally thought were panic attacks. Then she had her first GTC seizure  in January 2022 and another in April 2022 (diagnosed with Epilepsy at this time) after brother passed in March 2022. Her most recent seizure was in July 2023 and since then she had multiple auras in 2024 despite being on Keppra 1500mg BID and Lamotrigine 400mg/300mg but Pharmacy was unable to fulfill lamotrigine 100mg so she hasn't been taking increased dose. She is unaware of duration of seizure but is confused immediately and usually comes to when the ambulance/EMS arrives. Reports seizures with fall and convulsion but denies tongue bite, foaming of the mouth or urinary or fecal incontinence. 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 No electrographic seizures recorded." Patient reports Family Hx of seizures in her Maternal Uncle and her father has a h/o convulsing and passing out likely from panic attack. Her triggers include extreme emotions, stress, inconsistent sleep schedule. The patient has no known h/o febrile seizures or CNS infection. Denies any recent illness, travel, HA, dizziness, changes in vision, changes in bowel/bladder function, n/v.      29-year-old right-handed Female with PMHx of seizures presents for a pre-surgical evaluation while undergoing vEEG monitoring. Patient reports multiple episodes of feeling of lion vu and heart racing (auras) in 2021 after her Grandmother passed that she originally thought were panic attacks. Then she had her first GTC seizure  in January 2022 and another in April 2022 (diagnosed with Epilepsy at this time) after brother passed in March 2022. Her most recent seizure was in July 2023 and since then she had multiple auras in 2024 despite being on Keppra 1500mg BID and Lamotrigine 400mg/300mg however for the past 4 day the Pharmacy was unable to fulfill lamotrigine 100mg tabs so she did not take them in the morning. She is unaware of the duration of her seizure and reports confusion immediately followingand usually comes to when the ambulance/EMS arrives. Reports seizures with fall and convulsion but denies tongue bite, foaming of the mouth or urinary or fecal incontinence. 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 No electrographic seizures recorded." Patient reports Family Hx of seizures in her Maternal Uncle and her father has a h/o convulsing and passing out likely from panic attack. Her triggers include extreme emotions, stress, inconsistent sleep schedule. The patient has no known h/o febrile seizures or CNS infection. Denies any recent illness, travel, HA, dizziness, changes in vision, changes in bowel/bladder function, n/v.      29-year-old right-handed Female with PMHx of seizures presents for a pre-surgical evaluation while undergoing vEEG monitoring. Patient reports multiple episodes of feeling lion vu and heart racing (auras) in 2021, after her Grandmother passed that she originally thought were panic attacks. Then she had her first GTC seizure  in January 2022 and another in April 2022 (diagnosed with Epilepsy at this time) after brother passed in March 2022. Her most recent seizure was in July 2023 and since then she had multiple auras in 2024 despite being on Keppra 1500mg BID and Lamotrigine 400mg/300mg however for the past 4 days the Pharmacy was unable to fulfill Lamotrigine 100mg tabs so she did not take them in the morning. She is unaware of the duration of her seizures and reports confusion immediately following and usually comes to when the ambulance/EMS arrives. Patient reports seizures with fall and convulsion but denies tongue biting, foaming of the mouth or having urinary or fecal incontinence. 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 No electrographic seizures recorded." Patient reports Family Hx of seizures in her Maternal Uncle and her Father has a h/o convulsing and passing out likely from panic attack. Her triggers include extreme emotions, stress, andinconsistent sleep schedule. The patient has no known h/o febrile seizures or CNS infection. Denies any recent illness, travel, HA, dizziness, changes in vision, changes in bowel/bladder function, n/v.

## 2024-09-30 NOTE — H&P ADULT - NSHPREVIEWOFSYSTEMS_GEN_ALL_CORE
CONSTITUTIONAL:  No weight loss, fever, chills, weakness or fatigue.  HEENT:  Eyes:  No visual loss, blurred vision, double vision or yellow sclerae. Ears, Nose, Throat:  No hearing loss, sneezing, congestion, runny nose or sore throat.  SKIN:  No rash or itching.  CARDIOVASCULAR:  No chest pain, chest pressure or chest discomfort. No palpitations or edema.  RESPIRATORY:  No shortness of breath, cough or sputum.  GASTROINTESTINAL:  No anorexia, nausea, vomiting or diarrhea. No abdominal pain or blood.  GENITOURINARY: No Burning on urination.   NEUROLOGICAL:  see HPI  MUSCULOSKELETAL:  No muscle, back pain, joint pain or stiffness.  HEMATOLOGIC:  No anemia, bleeding or bruising.  LYMPHATICS:  No enlarged nodes. No history of splenectomy.  PSYCHIATRIC:  No history of depression or anxiety.  ENDOCRINOLOGIC:  No reports of sweating, cold or heat intolerance. No polyuria or polydipsia.  ALLERGIES:  No history of asthma, hives, eczema or rhinitis.

## 2024-09-30 NOTE — H&P ADULT - NS ATTEND AMEND GEN_ALL_CORE FT
Ann has auras of lion vu despite treatment of LTG 300mg bid.    Admitted on advice from Dr. Ward to characterize the auras as being seizures or other event.    Will continue on home dose and record vEEG.

## 2024-09-30 NOTE — PATIENT PROFILE ADULT - FUNCTIONAL ASSESSMENT - BASIC MOBILITY 6.
4-calculated by average/Not able to assess (calculate score using [facility] averaging method)

## 2024-09-30 NOTE — H&P ADULT - ASSESSMENT
29-year-old right-handed Female with PMHx of seizures presents to assess level of seizure frequency while undergoing vEEG monitoring with possible medication adjustment. Patient reports first onset of seizure in January 2022 after reported having multiple auras (feeling of dejavu and heart racing) in 2021 after her Grandmother passed, In April 2022, she had her second seizure and was diagnosed with Epilepsy after brother passed in March 2022. Her most recent seizure was in July 2023 and since then she had multiple auras in 2024 despite being on Keppra 1500mg BID and Lamotrigine 400mg/300mg but Pharmacy was unable to fulfill lamotrigine 100mg so she hasn't been taking increased dose. She is unaware of duration of seizure but is confused immediately and usually comes to when the ambulance/EMS arrives. Reports seizures with fall and convulsion but denies tongue bite, foaming of the mouth or urinary or fecal incontinence. 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 No electrographic seizures recorded." Patient reports Family Hx of seizures in her Maternal Uncle and her father has a h/o convulsing and passing out likely from panic attack. Her triggers include extreme emotions, stress, inconsistent sleep schedule. The patient has no known h/o febrile seizures or CNS infection. Denies any recent illness, travel, HA, dizziness, changes in vision, changes in bowel/bladder function, n/v.    29-year-old right-handed Female with PMHx of seizures presents for a pre-surgical evaluation while undergoing vEEG monitoring. Patient reports multiple episodes of feeling of lion vu and heart racing (auras) in 2021 after her Grandmother passed that she originally thought were panic attacks. Then she had her first GTC seizure  in January 2022 and another in April 2022 (diagnosed with Epilepsy at this time) after brother passed in March 2022. Her most recent seizure was in July 2023 and since then she had multiple auras in 2024 despite being on Keppra 1500mg BID and Lamotrigine 400mg/300mg however for the past 4 day the Pharmacy was unable to fulfill lamotrigine 100mg tabs so she did not take them in the morning. She is unaware of the duration of her seizure and reports confusion immediately followingand usually comes to when the ambulance/EMS arrives. Reports seizures with fall and convulsion but denies tongue bite, foaming of the mouth or urinary or fecal incontinence. Patient reports Family Hx of seizures in her Maternal Uncle and her father has a h/o convulsing and passing out likely from panic attack. Her triggers include extreme emotions, stress, inconsistent sleep schedule. The patient has no known h/o febrile seizures or CNS infection. Denies any recent illness, travel, HA, dizziness, changes in vision, changes in bowel/bladder function, n/v.      29-year-old right-handed Female with PMHx of seizures presents for a pre-surgical evaluation while undergoing vEEG monitoring. Patient reports multiple episodes of feeling lion vu and heart racing (auras) in 2021, after her Grandmother passed that she originally thought were panic attacks. Then she had her first GTC seizure  in January 2022 and another in April 2022 (diagnosed with Epilepsy at this time) after brother passed in March 2022. Her most recent seizure was in July 2023 and since then she had multiple auras in 2024 despite being on Keppra 1500mg BID and Lamotrigine 400mg/300mg however for the past 4 days the Pharmacy was unable to fulfill Lamotrigine 100mg tabs so she did not take them in the morning. She is unaware of the duration of her seizures and reports confusion immediately following and usually comes to when the ambulance/EMS arrives. Patient reports seizures with fall and convulsion but denies tongue biting, foaming of the mouth or having urinary or fecal incontinence. Patient reports Family Hx of seizures in her Maternal Uncle and her Father has a h/o convulsing and passing out likely from panic attack. Her triggers include extreme emotions, stress, andinconsistent sleep schedule. The patient has no known h/o febrile seizures or CNS infection. Denies any recent illness, travel, HA, dizziness, changes in vision, changes in bowel/bladder function, n/v.

## 2024-10-01 PROCEDURE — 96139 PSYCL/NRPSYC TST TECH EA: CPT

## 2024-10-01 PROCEDURE — 96132 NRPSYC TST EVAL PHYS/QHP 1ST: CPT

## 2024-10-01 PROCEDURE — 95720 EEG PHY/QHP EA INCR W/VEEG: CPT

## 2024-10-01 PROCEDURE — 99222 1ST HOSP IP/OBS MODERATE 55: CPT

## 2024-10-01 PROCEDURE — 96116 NUBHVL XM PHYS/QHP 1ST HR: CPT

## 2024-10-01 PROCEDURE — 96138 PSYCL/NRPSYC TECH 1ST: CPT

## 2024-10-01 PROCEDURE — 99233 SBSQ HOSP IP/OBS HIGH 50: CPT

## 2024-10-01 PROCEDURE — 96133 NRPSYC TST EVAL PHYS/QHP EA: CPT

## 2024-10-01 RX ORDER — LAMOTRIGINE 25 MG/1
300 TABLET ORAL
Refills: 0 | Status: DISCONTINUED | OUTPATIENT
Start: 2024-10-01 | End: 2024-10-02

## 2024-10-01 RX ORDER — LEVETIRACETAM 1000 MG
1500 TABLET ORAL
Refills: 0 | Status: DISCONTINUED | OUTPATIENT
Start: 2024-10-01 | End: 2024-10-02

## 2024-10-01 RX ADMIN — LAMOTRIGINE 300 MILLIGRAM(S): 25 TABLET ORAL at 08:38

## 2024-10-01 RX ADMIN — LAMOTRIGINE 300 MILLIGRAM(S): 25 TABLET ORAL at 21:11

## 2024-10-01 RX ADMIN — FOLIC ACID 1 MILLIGRAM(S): 1 TABLET ORAL at 08:38

## 2024-10-01 RX ADMIN — Medication 1500 MILLIGRAM(S): at 08:38

## 2024-10-01 RX ADMIN — Medication 1500 MILLIGRAM(S): at 21:12

## 2024-10-01 NOTE — DISCHARGE NOTE PROVIDER - NSDCMRMEDTOKEN_GEN_ALL_CORE_FT
folic acid 1 mg oral tablet: 1 tab(s) orally once a day  lamoTRIgine 100 mg oral tablet: 1 tab(s) orally once a day starting on 5/12  lamoTRIgine 150 mg oral tablet: 2 tab(s) orally 2 times a day  levETIRAcetam 750 mg oral tablet: 2 tab(s) orally 2 times a day   folic acid 1 mg oral tablet: 1 tab(s) orally once a day  lamoTRIgine 150 mg oral tablet: 2 tab(s) orally 2 times a day  levETIRAcetam 750 mg oral tablet: 2 tab(s) orally 2 times a day   clonazePAM 0.5 mg oral tablet, disintegratin tab(s) orally every 12 hours as needed for Seizure aura MDD: 1mg  folic acid 1 mg oral tablet: 1 tab(s) orally once a day  LaMICtal  mg oral tablet, extended release: 1 tab(s) orally every 12 hours  levETIRAcetam 750 mg oral tablet: 2 tab(s) orally 2 times a day

## 2024-10-01 NOTE — PROGRESS NOTE ADULT - SUBJECTIVE AND OBJECTIVE BOX
EPILEPSY PROGRESS NOTE:  No events overnight. No complaints currently.    REVIEW OF SYSTEMS:  As above, otherwise negative for constitutional/HEENT/CV/pulm/GI//MSK/neuro/derm/endocrine/psych.    MEDICATIONS:   MEDICATIONS  (STANDING):  folic acid 1 milliGRAM(s) Oral <User Schedule>  influenza   Vaccine 0.5 milliLiter(s) IntraMuscular once  lamoTRIgine 300 milliGRAM(s) Oral <User Schedule>  levETIRAcetam 1500 milliGRAM(s) Oral <User Schedule>    MEDICATIONS  (PRN):  acetaminophen     Tablet .. 650 milliGRAM(s) Oral every 6 hours PRN Temp greater or equal to 38.5C (101.3F), Mild Pain (1 - 3)  LORazepam   Injectable 2 milliGRAM(s) IV Push every 2 hours PRN Seizure Activity      VITAL SIGNS:  Vital Signs Last 24 Hrs  T(C): 36.8 (01 Oct 2024 05:58), Max: 36.8 (01 Oct 2024 05:58)  T(F): 98.3 (01 Oct 2024 05:58), Max: 98.3 (01 Oct 2024 05:58)  HR: 59 (01 Oct 2024 05:58) (59 - 78)  BP: 104/67 (01 Oct 2024 05:58) (104/67 - 125/88)  RR: 16 (01 Oct 2024 05:58) (16 - 18)  SpO2: 97% (01 Oct 2024 05:58) (96% - 97%)    O2 Parameters below as of 30 Sep 2024 20:53  Patient On (Oxygen Delivery Method): room air      PHYSICAL EXAM:  Eyes open spontaneously. Conversational with appropriate sentences.  EOMI. Visual fields full. PERRL 3>2. Face symmetrical.  Full strength throughout.  Finger-nose-finger intact R/L.  Intact to light touch throughout.      LABS:  CBC Full  -  ( 30 Sep 2024 16:00 )  WBC Count : 6.28 K/uL  RBC Count : 5.00 M/uL  Hemoglobin : 14.5 g/dL  Hematocrit : 44.4 %  Platelet Count - Automated : 280 K/uL  Mean Cell Volume : 88.8 fl  Mean Cell Hemoglobin : 29.0 pg  Mean Cell Hemoglobin Concentration : 32.7 gm/dL  Auto Neutrophil # : 3.60 K/uL  Auto Lymphocyte # : 1.95 K/uL  Auto Monocyte # : 0.55 K/uL  Auto Eosinophil # : 0.13 K/uL  Auto Basophil # : 0.03 K/uL  Auto Neutrophil % : 57.2 %  Auto Lymphocyte % : 31.1 %  Auto Monocyte % : 8.8 %  Auto Eosinophil % : 2.1 %  Auto Basophil % : 0.5 %    09-30    140  |  104  |  12  ----------------------------<  85  4.2   |  24  |  0.70    Ca    9.7      30 Sep 2024 16:00  Phos  3.1     09-30  Mg     2.0     09-30    TPro  8.1  /  Alb  4.9  /  TBili  0.4  /  DBili  x   /  AST  22  /  ALT  24  /  AlkPhos  69  09-30    LIVER FUNCTIONS - ( 30 Sep 2024 16:00 )  Alb: 4.9 g/dL / Pro: 8.1 g/dL / ALK PHOS: 69 U/L / ALT: 24 U/L / AST: 22 U/L / GGT: x           PT/INR - ( 30 Sep 2024 16:00 )   PT: 10.5 sec;   INR: 0.90     PTT - ( 30 Sep 2024 16:00 )  PTT:31.0 sec      RADIOLOGY:  MR Brain-Seizure, Epilepsy w/wo IV Cont (09.18.24 @ 15:26)     "IMPRESSION:  1. 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  Symmetry:  No persistent asymmetries of voltage or frequency.  Posterior Dominant Rhythm:  11 Hz symmetric, well-organized, and poorly-modulated.  Organization: Normal anterior to posterior gradient.  Voltage:  Normal (20+ uV)  Variability: Yes. 		Reactivity: Yes.  N2 sleep: Symmetric, synchronous spindles and K complexes.  Spontaneous Activity:  Occasional (1+/hr <1/min) left temporal spike waves with phase reversal at T3, present in N2 and slow wave sleep.   Periodic/rhythmic activity:  None.  Events:  No electrographic seizures or significant clinical events.  Provocations:  Hyperventilation and Photic stimulation: was not performed.    Daily Summary:    Occasional sleep-activated epileptiform discharges over the left temporal region. No electrographic seizures recorded." EPILEPSY PROGRESS NOTE:  No events overnight. No complaints currently.    REVIEW OF SYSTEMS:  As above, otherwise negative for constitutional/HEENT/CV/pulm/GI//MSK/neuro/derm/endocrine/psych.    MEDICATIONS:   MEDICATIONS  (STANDING):  folic acid 1 milliGRAM(s) Oral <User Schedule>  influenza   Vaccine 0.5 milliLiter(s) IntraMuscular once  lamoTRIgine 300 milliGRAM(s) Oral <User Schedule>  levETIRAcetam 1500 milliGRAM(s) Oral <User Schedule>    MEDICATIONS  (PRN):  acetaminophen     Tablet .. 650 milliGRAM(s) Oral every 6 hours PRN Temp greater or equal to 38.5C (101.3F), Mild Pain (1 - 3)  LORazepam   Injectable 2 milliGRAM(s) IV Push every 2 hours PRN Seizure Activity      VITAL SIGNS:  Vital Signs Last 24 Hrs  T(C): 36.8 (01 Oct 2024 05:58), Max: 36.8 (01 Oct 2024 05:58)  T(F): 98.3 (01 Oct 2024 05:58), Max: 98.3 (01 Oct 2024 05:58)  HR: 59 (01 Oct 2024 05:58) (59 - 78)  BP: 104/67 (01 Oct 2024 05:58) (104/67 - 125/88)  RR: 16 (01 Oct 2024 05:58) (16 - 18)  SpO2: 97% (01 Oct 2024 05:58) (96% - 97%)    O2 Parameters below as of 30 Sep 2024 20:53  Patient On (Oxygen Delivery Method): room air      PHYSICAL EXAM:  Eyes open spontaneously. Conversational with appropriate sentences.  EOMI. Visual fields full. PERRL 3>2. Face symmetrical.  Full strength throughout.  Finger-nose-finger intact R/L.  Intact to light touch throughout.      LABS:  CBC Full  -  ( 30 Sep 2024 16:00 )  WBC Count : 6.28 K/uL  RBC Count : 5.00 M/uL  Hemoglobin : 14.5 g/dL  Hematocrit : 44.4 %  Platelet Count - Automated : 280 K/uL  Mean Cell Volume : 88.8 fl  Mean Cell Hemoglobin : 29.0 pg  Mean Cell Hemoglobin Concentration : 32.7 gm/dL  Auto Neutrophil # : 3.60 K/uL  Auto Lymphocyte # : 1.95 K/uL  Auto Monocyte # : 0.55 K/uL  Auto Eosinophil # : 0.13 K/uL  Auto Basophil # : 0.03 K/uL  Auto Neutrophil % : 57.2 %  Auto Lymphocyte % : 31.1 %  Auto Monocyte % : 8.8 %  Auto Eosinophil % : 2.1 %  Auto Basophil % : 0.5 %    09-30    140  |  104  |  12  ----------------------------<  85  4.2   |  24  |  0.70    Ca    9.7      30 Sep 2024 16:00  Phos  3.1     09-30  Mg     2.0     09-30    TPro  8.1  /  Alb  4.9  /  TBili  0.4  /  DBili  x   /  AST  22  /  ALT  24  /  AlkPhos  69  09-30    LIVER FUNCTIONS - ( 30 Sep 2024 16:00 )  Alb: 4.9 g/dL / Pro: 8.1 g/dL / ALK PHOS: 69 U/L / ALT: 24 U/L / AST: 22 U/L / GGT: x           PT/INR - ( 30 Sep 2024 16:00 )   PT: 10.5 sec;   INR: 0.90     PTT - ( 30 Sep 2024 16:00 )  PTT:31.0 sec      RADIOLOGY:  MR Brain-Seizure, Epilepsy w/wo IV Cont (09.18.24 @ 15:26)     "IMPRESSION:  1. 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  Background:  continuous, with predominantly alpha and beta frequencies.  Voltage:  Normal (20+ uV)  Organization:  Appropriate anterior-posterior gradient  Posterior Dominant Rhythm:  10 Hz symmetric, well-organized, and well-modulated  Sleep:  Symmetric, synchronous spindles and K complexes.  Focal abnormalities:  No persistent asymmetries of voltage or frequency.  Spontaneous Activity:  Occasional ( >1/hr < 1/min)  Left frontotemporal (F7)  Epileptiform sharp waves ( msec)   Events:  •	No electrographic seizures or significant clinical events occurred during this study.  Provocations:  •	Hyperventilation: did not evoke EEG abnormalities.  •	Photic stimulation: was not performed.  Daily Summary:    •	There were epileptiform discharges over the left fronto-temporal region, maximal at F7.  •	The patient's typical events were not recorded during the study."    "Day 1: 4/13/2022 @ 7:56 PM to next morning @ 7:00 AM  Background: continuous, with predominantly alpha and beta frequencies.  Symmetry:  No persistent asymmetries of voltage or frequency.  Posterior Dominant Rhythm:  11 Hz symmetric, well-organized, and poorly-modulated.  Organization: Normal anterior to posterior gradient.  Voltage:  Normal (20+ uV)  Variability: Yes. 		Reactivity: Yes.  N2 sleep: Symmetric, synchronous spindles and K complexes.  Spontaneous Activity:  Occasional (1+/hr <1/min) left temporal spike waves with phase reversal at T3, present in N2 and slow wave sleep.   Periodic/rhythmic activity:  None.  Events:  No electrographic seizures or significant clinical events.  Provocations:  Hyperventilation and Photic stimulation: was not performed.    Daily Summary:    Occasional sleep-activated epileptiform discharges over the left temporal region. No electrographic seizures recorded."

## 2024-10-01 NOTE — CONSULT NOTE ADULT - ASSESSMENT
Ms. Ann Hughes is a 29/F with seizure disorder on Keppra and Lamotrigine admitted for seizures and vEEG monitoring.     Recommendations:    #Epilepsy  - vEEG ongoing  - pending Neuro-Psych eval  - Keppra and Lamotrigine levels  - seizure precautions  - management per epilepsy team    #Tobacco and cannabis use  - smoking cessation counseling  - SW referral    Feel free to reach out for any questions. Recommendations discussed with primary team.

## 2024-10-01 NOTE — DISCHARGE NOTE PROVIDER - HOSPITAL COURSE
EPILEYPSY DICHARGE NOTE:  HPI:  29-year-old right-handed Female with PMHx of seizures presents for a pre-surgical evaluation while undergoing vEEG monitoring. Patient reports multiple episodes of feeling lion vu and heart racing (aur[place]) in 2021, after her Grandmother passed that she originally thought were panic attacks. Then she had her first GTC seizure  in January 2022 and another in April 2022 (diagnosed with Epilepsy at this time) after brother passed in March 2022. Her most recent seizure was in July 2023 and since then she had multiple auras in 2024 despite being on Keppra 1500mg BID and Lamotrigine 400mg/300mg however for the past 4 days the Pharmacy was unable to fulfill Lamotrigine 100mg tabs so she did not take them in the morning. She is unaware of the duration of her seizures and reports confusion immediately following and usually comes to when the ambulance/EMS arrives. Patient reports seizures with fall and convulsion but denies tongue biting, foaming of the mouth or having urinary or fecal incontinence. Patient reports Family Hx of seizures in her Maternal Uncle and her Father has a h/o convulsing and passing out likely from panic attack. Her triggers include extreme emotions, stress, and inconsistent sleep schedule. The patient has no known h/o febrile seizures or CNS infection.    Hospital course include vEEG monitoring, and EEG showed ..... from 09/30/2024 - date. Additional tests performed on date, and the results revealed ...... Medical issues and the interventions (if any). Patient is neurologically stable and medically cleared for discharge to home/subacute rehab/acute rehab.     Medications adjusted:    New Medications:      EDUCATION:  Patient given written education on SUDEP: YES    Follow-up:  Please follow-up with Dr Ward in 4 - 6 weeks ( will call you with a date and time).   Please follow-up with your PCP in 2 weeks  Please follow-up with other specialists in 2 - 4 weeks.      EPILEYPSY DICHARGE NOTE:  HPI:  29-year-old right-handed Female with PMHx of seizures presents for assessment of level of seizure and possible medication adjustment while undergoing vEEG monitoring. Patient reports multiple episodes of feeling lion vu and heart racing (auras) in 2021, after her Grandmother passed that she originally thought were panic attacks. Then she had her first GTC seizure  in January 2022 and another in April 2022 (diagnosed with Epilepsy at this time) after brother passed in March 2022. Her most recent seizure was in July 2023 and since then she had multiple auras in 2024 despite being on Keppra 1500mg BID and Lamotrigine 400mg/300mg however for the past 4 days the Pharmacy was unable to fulfill Lamotrigine 100mg tabs so she did not take them in the morning. She is unaware of the duration of her seizures and reports confusion immediately following and usually comes to when the ambulance/EMS arrives. Patient reports seizures with fall and convulsion but denies tongue biting, foaming of the mouth or having urinary or fecal incontinence. Patient reports Family Hx of seizures in her Maternal Uncle and her Father has a h/o convulsing and passing out likely from panic attack. Her triggers include extreme emotions, stress, and inconsistent sleep schedule. The patient has no known h/o febrile seizures or CNS infection.    Hospital course include vEEG monitoring, and EEG showed epileptiform discharges over the left fronto-temporal region from 09/30/2024 - ....date. Additional tests performed on date, and the results revealed ...... Medical issues and the interventions (if any). Patient is neurologically stable and medically cleared for discharge to home/subacute rehab/acute rehab.     Medications adjusted:    New Medications:      EDUCATION:  Patient given written education on SUDEP: YES    Follow-up:  Please follow-up with Dr Ward in 4 - 6 weeks ( will call you with a date and time).   Please follow-up with your PCP in 2 weeks  Please follow-up with other specialists in 2 - 4 weeks.      EPILEYPSY DICHARGE NOTE:  HPI:  29-year-old right-handed Female with PMHx of seizures presents for assessment of level of seizure and possible medication adjustment while undergoing vEEG monitoring. Patient reports multiple episodes of feeling lion vu and heart racing (auras) in 2021, after her Grandmother passed that she originally thought were panic attacks. Then she had her first GTC seizure  in January 2022 and another in April 2022 (diagnosed with Epilepsy at this time) after brother passed in March 2022. Her most recent seizure was in July 2023 and since then she had multiple auras in 2024 despite being on Keppra 1500mg BID and Lamotrigine 400mg/300mg however for the past 4 days the Pharmacy was unable to fulfill Lamotrigine 100mg tabs so she did not take them in the morning. She is unaware of the duration of her seizures and reports confusion immediately following and usually comes to when the ambulance/EMS arrives. Patient reports seizures with fall and convulsion but denies tongue biting, foaming of the mouth or having urinary or fecal incontinence. Patient reports Family Hx of seizures in her Maternal Uncle and her Father has a h/o convulsing and passing out likely from panic attack. Her triggers include extreme emotions, stress, and inconsistent sleep schedule. The patient has no known h/o febrile seizures or CNS infection.    Hospital course include vEEG monitoring, and 09/30/2024 - 10/2/2024 EEG showed epileptiform discharges over the left fronto-temporal region so far. Neuro-psychological evaluation was done 10/1/2024 and pending report outpatient.Additional tests performed on date, and the results revealed ...... Medical issues and the interventions (if any). Patient is neurologically stable and medically cleared for discharge to home/subacute rehab/acute rehab.     Medications adjusted:  On 10/2 Keppra was changed from 1500mg BID to 750 mg BID    New Medications:      EDUCATION:  Patient given written education on SUDEP: YES    Follow-up:  Please follow-up with Dr Ward in 4 - 6 weeks ( will call you with a date and time).   Please follow-up with your PCP in 2 weeks  Please follow-up with other specialists in 2 - 4 weeks.      EPILEYPSY DICHARGE NOTE:  HPI:  29-year-old right-handed Female with PMHx of seizures presents for an assessment of level of seizure and possible medication adjustment while undergoing vEEG monitoring. Patient reported multiple episodes of feeling lion vu and heart racing (auras) in 2021, after her Grandmother passed that she originally thought were panic attacks. Then she had her first GTC seizure  in January 2022 and another in April 2022 (diagnosed with Epilepsy at this time) after brother passed in March 2022. Her most recent seizure was in July 2023 and since then she had multiple auras in 2024 despite being on Keppra 1500mg BID and Lamotrigine 400mg/300mg however for the past 4 days the Pharmacy was unable to fulfill Lamotrigine 100mg tabs so she did not take them in the morning. She is unaware of the duration of her seizures and reports confusion immediately following and usually comes to when the ambulance/EMS arrives. Patient reports seizures with fall and convulsion but denies tongue biting, foaming of the mouth or having urinary or fecal incontinence. Patient reports Family Hx of seizures in her Maternal Uncle and her Father has a h/o convulsing and passing out likely from panic attack. Her triggers include extreme emotions, stress, and inconsistent sleep schedule. The patient has no known h/o febrile seizures or CNS infection.    Hospital course include vEEG monitoring, and 09/30/2024 - 10/8/2024. EEG showed epileptiform discharges over the left fronto-temporal region. Neuro-psychological evaluation was done 10/1/2024 and pending report outpatient. Patient is neurologically stable and medically cleared for discharge to home.     Medications adjusted:  On 10/2 Keppra was changed from 1500mg BID to 750 mg BID  on 10/7 restarted on Keppra 1500mg BID    New Medications: None    EDUCATION:  Patient given written education on SUDEP: YES    Follow-up:  Please follow-up with Dr Ward in 4 - 6 weeks ( will call you with a date and time).   Please follow-up with your PCP in 2 weeks  Please follow-up with other specialists in 2 - 4 weeks.      EPILEYPSY DICHARGE NOTE:  HPI:  29-year-old right-handed Female with PMHx of seizures presents for an assessment of level of seizure and possible medication adjustment while undergoing vEEG monitoring. Patient reported multiple episodes of feeling lion vu and heart racing (aur[place]) in 2021, after her Grandmother passed that she originally thought were panic attacks. Then she had her first GTC seizure  in January 2022 and another in April 2022 (diagnosed with Epilepsy at this time) after brother passed in March 2022. Her most recent seizure was in July 2023 and since then she had multiple auras in 2024 despite being on Keppra 1500mg BID and Lamotrigine 400mg/300mg however for the past 4 days the Pharmacy was unable to fulfill Lamotrigine 100mg tabs so she did not take them in the morning. She is unaware of the duration of her seizures and reports confusion immediately following and usually comes to when the ambulance/EMS arrives. Patient reports seizures with fall and convulsion but denies tongue biting, foaming of the mouth or having urinary or fecal incontinence. Patient reports Family Hx of seizures in her Maternal Uncle and her Father has a h/o convulsing and passing out likely from panic attack. Her triggers include extreme emotions, stress, and inconsistent sleep schedule. The patient has no known h/o febrile seizures or CNS infection.    Hospital course include vEEG monitoring, and 09/30/2024 - 10/8/2024. EEG showed epileptiform discharges over the left fronto-temporal region. Neuro-psychological evaluation was done 10/1/2024 and pending report outpatient......On 10/2 Keppra was changed from 1500mg BID to 750 mg BID  on 10/7 restarted on Keppra 1500mg BID.......Patient is neurologically stable and medically cleared for discharge to home.     Medications adjusted:  None    New Medications: None    EDUCATION:  Patient given written education on SUDEP: YES    Follow-up:  Please follow-up with Dr Ward in 4 - 6 weeks ( will call you with a date and time).   Please follow-up with your PCP in 2 weeks  Please follow-up with other specialists in 2 - 4 weeks.      EPILEYPSY DICHARGE NOTE:  HPI:  29-year-old right-handed Female with PMHx of seizures presents for an assessment of level of seizure and possible medication adjustment while undergoing vEEG monitoring. Patient reported multiple episodes of feeling lion vu and heart racing (aur[place]) in 2021, after her Grandmother passed that she originally thought were panic attacks. Then she had her first GTC seizure  in January 2022 and another in April 2022 (diagnosed with Epilepsy at this time) after brother passed in March 2022. Her most recent seizure was in July 2023 and since then she had multiple auras in 2024 despite being on Keppra 1500mg BID and Lamotrigine 400mg/300mg however for the past 4 days the Pharmacy was unable to fulfill Lamotrigine 100mg tabs so she did not take them in the morning. She is unaware of the duration of her seizures and reports confusion immediately following and usually comes to when the ambulance/EMS arrives. Patient reports seizures with fall and convulsion but denies tongue biting, foaming of the mouth or having urinary or fecal incontinence. Patient reports Family Hx of seizures in her Maternal Uncle and her Father has a h/o convulsing and passing out likely from panic attack. Her triggers include extreme emotions, stress, and inconsistent sleep schedule. The patient has no known h/o febrile seizures or CNS infection.    Hospital course include vEEG monitoring from 09/30/2024 - 10/8/2024 that showed epileptiform discharges over the left fronto-temporal region, and no seizures captured. Patient states that she had 2 shorter versions of her aura on 10/6, but no seizures seen on EEG. Neuro-psychological evaluation was done 10/1/2024 and pending report outpatient. On 10/2 Keppra was changed from 1500mg BID to 750 mg BID, and on 10/7 restarted on Keppra 1500mg BID. Patient is neurologically stable and medically cleared for discharge to home.     Medications adjusted:  None    New Medications:   None    EDUCATION:  Patient given written education on SUDEP: YES    Follow-up:  Please follow-up with Dr Ward in 4 - 6 weeks ( will call you with a date and time).   Please follow-up with your PCP in 2 weeks  Please follow-up with other specialists in 2 - 4 weeks.

## 2024-10-01 NOTE — DISCHARGE NOTE PROVIDER - NSDCCPCAREPLAN_GEN_ALL_CORE_FT
PRINCIPAL DISCHARGE DIAGNOSIS  Diagnosis: Epilepsy  Assessment and Plan of Treatment: A seizure is abnormal electrical activity in the brain; the specific cause may or may not be found. Prior to a seizure you may experience a warning sensation (aura) that may include fear, nausea, dizziness, and visual changes such as flashing lights of spots. Common symptoms during the seizure may include an altered mental status, rhythmic jerking movements, drooling, grunting, loss of bladder or bowel control, or tongue biting. After a seizure, you may feel confused and sleepy.   Teach friends and family what to do if you HAVE a seizure which includes laying you on the ground with your head on a cushion and turning you to the side to keep your breathing passages clear in case of vomiting.  SEEK IMMEDIATE MEDICAL CARE IF YOU HAVE ANY OF THE FOLLOWING SYMPTOMS: seizure lasting over 5 minutes, not waking up or persistent altered mental status after the seizure, or more frequent or worsening seizures.  Medicine side effects  *Skin rash  *Fever of 100.4°F (38°C) or higher, or as directed by your provider  *Symptoms get worse or you have new symptoms

## 2024-10-01 NOTE — PROGRESS NOTE ADULT - NS ATTEND AMEND GEN_ALL_CORE FT
Overnight there were sharp waves in the left fronto-temporal region at F7.  No definite auras occurred, though she felt odd at times.  She reported being unwilling to have surgery one day for the choroid plexus cyst.

## 2024-10-01 NOTE — PROGRESS NOTE ADULT - PROBLEM SELECTOR PLAN 1
- Continue VEEG monitoring (09/30/24-   - Sleep deprivation, Photic Stimulation and hyperventilation today  - Neuro-psychology eval today  - Ativan 2mg IVP PRN for seizures > 2mins  - f/u Labs including Keppra and Lamotrigine levels  - Continue Keppra 1500mg BID and Lamotrigine 300mg BID  - Neurological assessment Q8hrs  - Maintain Seizure/Fall/Aspiration precautions. - Continue VEEG monitoring (09/30/24-   - Sleep deprivation, Photic Stimulation and hyperventilation today  - Neuro-psychology eval today  - Ativan 2mg IVP PRN for seizures > 2mins  - f/u Labs including Keppra and Lamotrigine levels  - Will hold AM meds: Keppra 1500mg and Lamotrigine 300mg tomorrow 10/02/2024  - Vitals & Neurological assessment Q8hrs  - Maintain Seizure/Fall/Aspiration precautions.

## 2024-10-01 NOTE — DISCHARGE NOTE PROVIDER - NSDCCPTREATMENT_GEN_ALL_CORE_FT
PRINCIPAL PROCEDURE  Procedure: EEG, with video recording, 36-60 hours  Findings and Treatment: Day 1 9/30/2024, 4:21:24 PM to next morning at 07:00 AM.  Background:  continuous, with predominantly alpha and beta frequencies.  Voltage:  Normal (20+ uV)  Organization:  Appropriate anterior-posterior gradient  Posterior Dominant Rhythm:  10 Hz symmetric, well-organized, and well-modulated  Sleep:  Symmetric, synchronous spindles and K complexes.  Focal abnormalities:  No persistent asymmetries of voltage or frequency.  Spontaneous Activity:  Occasional ( >1/hr < 1/min)  Left frontotemporal (F7)  Epileptiform sharp waves ( msec)   Events:  •	No electrographic seizures or significant clinical events occurred during this study.  Provocations:  •	Hyperventilation: did not evoke EEG abnormalities.  •	Photic stimulation: was not performed.  Daily Summary:    •	There were epileptiform discharges over the left fronto-temporal region, maximal at F7.  •	The patient's typical events were not recorded during the study.       PRINCIPAL PROCEDURE  Procedure: EEG, with video recording, 36-60 hours  Findings and Treatment: Day 1 9/30/2024, 4:21:24 PM to Day 7 10/8/2024  Events:  1 push button activation without change in EEG background:  d1 18:24:18		pauses here  d1 18:24:23		Patient Event activated  another aura-like feeling around 02:30 AM, without EEG correlate.  Provocations:  •	Hyperventilation @ 3:59 PM: pt felt numb, but HV did not evoke EEG abnormalities.  •	Photic stimulation  Push button activation at 3:42 PM(Oct 5) for aura reported (fluctuating dejavu feeling//anxiety feeling) with no clear associated epileptiform abnormalities. There was prominent electrode artifact   Summary:    •	There were epileptiform discharges over the left fronto-temporal region perhaps slightly more obvious especially during drowsiness and sleep, maximal at F7.  •	None of the patient's typical events were recorded.

## 2024-10-01 NOTE — DISCHARGE NOTE PROVIDER - NSDCFUADDINST_GEN_ALL_CORE_FT
Seizure Safety Instructions 1. [facility] law mandates you to self-report your seizure disorder to [facility], and be seizure-free for 1yr before you can drive.  2. Avoid swimming, diving, taking a bath, cooking, use of motarized machineries. 3. Avoid climbing a ladder, trees or any height. 4. Use machines with safety switches. 5. Always be aware of your surroundings and make sure family and friends are aware of your seizures. 6. Use non-breakable dishes. 7. Consider wearing a medical bracelet to inform people you have epilepsy in case of an emergency.

## 2024-10-01 NOTE — DISCHARGE NOTE PROVIDER - CARE PROVIDER_API CALL
Brenda Ward  Neurology  [address]  [place] [address]  Phone: [phone]  Fax: [phone]  Follow Up Time:    Brenda Ward  Neurology  [address]  [place] [address]  Phone: [phone]  Fax: [phone]  Follow Up Time:     Hawa Barros  Physician Assistant Services  [address] [address]  Phone: [phone]  Fax: [phone]  Follow Up Time:

## 2024-10-01 NOTE — PROGRESS NOTE ADULT - PROBLEM SELECTOR PLAN 2
Nutrition & Prophylaxis:    F: None  E:  K>4, Mg>2, Phos >3  N: Regular diet  DVT PPx: SCDs  GI PPx: None  Dispo: [id number]  Code: FULL CODE

## 2024-10-01 NOTE — DISCHARGE NOTE PROVIDER - PROVIDER TOKENS
PROVIDER:[TOKEN:[[id number]]] PROVIDER:[TOKEN:[[id number]]],PROVIDER:[TOKEN:[[id number]]]

## 2024-10-01 NOTE — CONSULT NOTE ADULT - SUBJECTIVE AND OBJECTIVE BOX
JOLEEN RIVERA  29y/Female    Patient is a 29y old  Female who presents with a chief complaint of seizures.    Ms. Joleen Rivera is a 29/F with seizure disorder on Keppra and Lamotrigine admitted for seizures and vEEG monitoring. Per H&P, "Patient reports multiple episodes of feeling lion vu and heart racing (auras) in 2021, after her Grandmother passed that she originally thought were panic attacks. Then she had her first GTC seizure  in January 2022 and another in April 2022 (diagnosed with Epilepsy at this time) after brother passed in March 2022. Her most recent seizure was in July 2023 and since then she had multiple auras in 2024 despite being on Keppra 1500mg BID and Lamotrigine 400mg/300mg however for the past 4 days the Pharmacy was unable to fulfill Lamotrigine 100mg tabs so she did not take them in the morning. She is unaware of the duration of her seizures and reports confusion immediately following and usually comes to when the ambulance/EMS arrives. Patient reports seizures with fall and convulsion but denies tongue biting, foaming of the mouth or having urinary or fecal incontinence. 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 No electrographic seizures recorded." Patient reports Family Hx of seizures in her Maternal Uncle and her Father has a h/o convulsing and passing out likely from panic attack. Her triggers include extreme emotions, stress, andinconsistent sleep schedule. The patient has no known h/o febrile seizures or CNS infection. Denies any recent illness, travel, HA, dizziness, changes in vision, changes in bowel/bladder function, n/v". The patient reports that she takes Keppra 1500 mg BID and Lamotrigine which was recently increased to 400 mg BID (from 300 mg BID). She however reports the past 4 days prior to presentation, she has not been getting the full 400 mg dose of Lamotrigine from her pharmacy. She also reports taking what she recalls at Clonazepam as needed for auras which she also ran out of.     She currently feels well and denies headache, dizziness, blurring of vision, focal weakness, numbness, fever, neck pain, chest pain, dyspnea, diarrhea, abdominal pain, vomiting, bleeding symptoms, leg pain, itching, depressed mood.      REVIEW OF SYSTEMS:  The rest of the 12 systems reviewed and found negative except as above    PAST MEDICAL & SURGICAL HISTORY:  Epilepsy    PERSONAL SOCIAL HISTORY:  smokes tobacco with marijuana daily  Unemployed, use to be a  ([employer]). Lives with roommate. IUD in place (30 Sep 2024 15:19)    FAMILY HISTORY:  Maternal family history of seizure disorder (Sibling)  Father had some convulsions with clear triggers but unclear if seizure disorder    HOME MEDICATIONS:  Keppra 1500 mg BID  Lamotrigine 400 mg BID    MEDICATIONS  (STANDING):  folic acid 1 milliGRAM(s) Oral <User Schedule>  influenza   Vaccine 0.5 milliLiter(s) IntraMuscular once  lamoTRIgine 300 milliGRAM(s) Oral <User Schedule>  levETIRAcetam 1500 milliGRAM(s) Oral <User Schedule>    MEDICATIONS  (PRN):  acetaminophen     Tablet .. 650 milliGRAM(s) Oral every 6 hours PRN Temp greater or equal to 38.5C (101.3F), Mild Pain (1 - 3)  LORazepam   Injectable 2 milliGRAM(s) IV Push every 2 hours PRN Seizure Activity    T(C): 36.8 (10-01-24 @ 11:55), Max: 36.8 (10-01-24 @ 05:58)  HR: 70 (10-01-24 @ 11:55) (59 - 78)  BP: 106/73 (10-01-24 @ 11:55) (104/67 - 125/88)  RR: 16 (10-01-24 @ 11:55) (16 - 18)  SpO2: 98% (10-01-24 @ 11:55) (96% - 98%)  Wt(kg): --Vital Signs Last 24 Hrs  T(C): 36.8 (01 Oct 2024 11:55), Max: 36.8 (01 Oct 2024 05:58)  T(F): 98.3 (01 Oct 2024 11:55), Max: 98.3 (01 Oct 2024 05:58)  HR: 70 (01 Oct 2024 11:55) (59 - 78)  BP: 106/73 (01 Oct 2024 11:55) (104/67 - 125/88)  BP(mean): --  RR: 16 (01 Oct 2024 11:55) (16 - 18)  SpO2: 98% (01 Oct 2024 11:55) (96% - 98%)    Parameters below as of 01 Oct 2024 11:55  Patient On (Oxygen Delivery Method): room air    Oxygen Saturation Index= Unable to calculate   [Based on FiO2 = Unknown, SpO2 = 98(10/01/2024 11:55), MAP = Unknown]  Daily Height in cm: 158.75 (30 Sep 2024 14:07)    Daily     PHYSICAL EXAM:  GENERAL: NAD  HEAD:  vEEG leads on   EYES: EOMI, conjunctiva and sclera clear  ENMT: No tonsillar erythema, exudates, or enlargement; Moist mucous membranes  NECK: Supple, No JVD  NERVOUS SYSTEM:  Alert & Oriented X3, Good concentration; Moves all extremities  CHEST/LUNG: Clear to auscultation bilaterally  HEART: Regular rate and rhythm; Normal S1 and S2  ABDOMEN: Soft, Nontender, Nondistended; Bowel sounds present  EXTREMITIES:  2+ Peripheral Pulses, No clubbing, cyanosis, or edema  PSYCH: Normal mood and affect    Consultant(s) Notes Reviewed:  [x ] YES  [ ] NO  Care Discussed with Consultants/Other Providers [ x] YES  [ ] NO    LABS:  CBC   09-30-24 @ 16:00  Hematcorit 44.4  Hemoglobin 14.5  Mean Cell Hemoglobin 29.0  Platelet Count-Automated 280  RBC Count 5.00  Red Cell Distrib Width 13.2  Wbc Count 6.28    BMP  09-30-24 @ 16:00  Anion Gap. Serum 12  Blood Urea Nitrogen,Serm 12  Calcium, Total Serum 9.7  Carbon Dioxide, Serum 24  Chloride, Serum 104  Creatinine, Serum 0.70  eGFR in  --  eGFR in Non Afican American --  Gloucose, serum 85  Potassium, Serum 4.2  Sodium, Serum 140    CMP  09-30-24 @ 16:00  Jada Aminotransferase(ALT/SGPT)24  Albumin, Serum 4.9  Alkaline Phosphatase, Serum 69  Anion Gap, Serum 12  Aspartate Aminotransferase (AST/SGOT)22  Bilirubin Total, Serum 0.4  Blood Urea Nitrogen, Serum 12  Calcium,Total Serum 9.7  Carbon Dioxide, Serum 24  Chloride, Serum 104  Creatinine, Serum 0.70  eGFR if  --  eGFR if Non African American --  Glucose, Serum 85  Potassium, Serum 4.2  Protein Total, Serum 8.1  Sodium, Serum 140    PT/INR  09-30-24 @ 16:00  INR 0.90  Prothrombin Time Comment --  Prothrobin Time, [id number]    ry Reviewed:  [ ] YES  [ ] NO

## 2024-10-01 NOTE — PROGRESS NOTE ADULT - ASSESSMENT
29-year-old right-handed Female with PMHx of seizures presents for a pre-surgical evaluation while undergoing vEEG monitoring. Patient reports multiple episodes of feeling lion vu and heart racing (auras) in 2021, after her Grandmother passed that she originally thought were panic attacks. Then she had her first GTC seizure  in January 2022 and another in April 2022 (diagnosed with Epilepsy at this time) after brother passed in March 2022. Her most recent seizure was in July 2023 and since then she had multiple auras in 2024 despite being on Keppra 1500mg BID and Lamotrigine 400mg/300mg however for the past 4 days the Pharmacy was unable to fulfill Lamotrigine 100mg tabs so she did not take them in the morning. She is unaware of the duration of her seizures and reports confusion immediately following and usually comes to when the ambulance/EMS arrives. Patient reports seizures with fall and convulsion but denies tongue biting, foaming of the mouth or having urinary or fecal incontinence. Denies any recent illness, travel, HA, dizziness, changes in vision, changes in bowel/bladder function, n/v.  29-year-old right-handed Female with PMHx of seizures presents for assessment of level of seizure and possible medication adjustment while undergoing vEEG monitoring. Patient reports multiple episodes of feeling lion vu and heart racing (auras) in 2021, after her Grandmother passed that she originally thought were panic attacks. Then she had her first GTC seizure  in January 2022 and another in April 2022 (diagnosed with Epilepsy at this time) after brother passed in March 2022. Her most recent seizure was in July 2023 and since then she had multiple auras in 2024 despite being on Keppra 1500mg BID and Lamotrigine 400mg/300mg however for the past 4 days the Pharmacy was unable to fulfill Lamotrigine 100mg tabs so she did not take them in the morning. She is unaware of the duration of her seizures and reports confusion immediately following and usually comes to when the ambulance/EMS arrives. Patient reports seizures with fall and convulsion but denies tongue biting, foaming of the mouth or having urinary or fecal incontinence. Denies any recent illness, travel, HA, dizziness, changes in vision, changes in bowel/bladder function, n/v.

## 2024-10-01 NOTE — DISCHARGE NOTE PROVIDER - CARE PROVIDERS DIRECT ADDRESSES
,[email or web address],DirectAddress_Unknown

## 2024-10-02 PROCEDURE — 99233 SBSQ HOSP IP/OBS HIGH 50: CPT

## 2024-10-02 PROCEDURE — 95720 EEG PHY/QHP EA INCR W/VEEG: CPT

## 2024-10-02 PROCEDURE — 99232 SBSQ HOSP IP/OBS MODERATE 35: CPT

## 2024-10-02 RX ORDER — LEVETIRACETAM 1000 MG
750 TABLET ORAL
Refills: 0 | Status: DISCONTINUED | OUTPATIENT
Start: 2024-10-02 | End: 2024-10-03

## 2024-10-02 RX ORDER — ACETAMINOPHEN 325 MG
650 TABLET ORAL EVERY 6 HOURS
Refills: 0 | Status: DISCONTINUED | OUTPATIENT
Start: 2024-10-02 | End: 2024-10-08

## 2024-10-02 RX ORDER — LAMOTRIGINE 25 MG/1
300 TABLET ORAL
Refills: 0 | Status: DISCONTINUED | OUTPATIENT
Start: 2024-10-02 | End: 2024-10-03

## 2024-10-02 RX ADMIN — LAMOTRIGINE 300 MILLIGRAM(S): 25 TABLET ORAL at 12:08

## 2024-10-02 RX ADMIN — LAMOTRIGINE 300 MILLIGRAM(S): 25 TABLET ORAL at 21:05

## 2024-10-02 RX ADMIN — Medication 750 MILLIGRAM(S): at 21:05

## 2024-10-02 RX ADMIN — Medication 750 MILLIGRAM(S): at 12:08

## 2024-10-02 RX ADMIN — FOLIC ACID 1 MILLIGRAM(S): 1 TABLET ORAL at 08:41

## 2024-10-02 NOTE — PROGRESS NOTE ADULT - SUBJECTIVE AND OBJECTIVE BOX
Patient is a 29y old  Female who presents with a chief complaint of seizures (01 Oct 2024 13:48).    Patient seen and examined today. She reports feeling well and denies headache, focal weakness, numbness, chest pain, fever, dyspnea. She reports that she might have felt some aura overnight and mentioned it to the primary team.     Allergies    No Known Allergies    Last Bowel Movement: 29-Sep-2024 (10-01-24 @ 12:23)    MEDICATIONS  (STANDING):  folic acid 1 milliGRAM(s) Oral <User Schedule>  influenza   Vaccine 0.5 milliLiter(s) IntraMuscular once  lamoTRIgine 300 milliGRAM(s) Oral <User Schedule>  levETIRAcetam 750 milliGRAM(s) Oral <User Schedule>    MEDICATIONS  (PRN):  acetaminophen     Tablet .. 650 milliGRAM(s) Oral every 6 hours PRN Temp greater or equal to 38.5C (101.3F), Mild Pain (1 - 3)  LORazepam   Injectable 2 milliGRAM(s) IV Push every 2 hours PRN Seizure Activity    Vital Signs Last 24 Hrs  T(C): 36.7 (10-02-24 @ 12:50), Max: 36.8 (10-01-24 @ 18:30)  T(F): 98 (10-02-24 @ 12:50), Max: 98.3 (10-01-24 @ 18:30)  HR: 64 (10-02-24 @ 12:50) (59 - 72)  BP: 108/65 (10-02-24 @ 12:50) (92/56 - 118/69)  BP(mean): 76 (10-01-24 @ 21:03) (76 - 76)  RR: 20 (10-02-24 @ 12:50) (16 - 20)  SpO2: 98% (10-02-24 @ 12:50) (97% - 98%)    I&O's Summary    Oxygen Saturation Index= Unable to calculate   [Based on FiO2 = Unknown, SpO2 = 98(10/02/2024 12:50), MAP = Unknown]    PHYSICAL EXAM:  GENERAL: NAD  HEAD:  vEEG leads on   EYES: EOMI, conjunctiva and sclera clear  ENMT: No tonsillar erythema, exudates, or enlargement; Moist mucous membranes  NECK: Supple, No JVD  NERVOUS SYSTEM:  Alert & Oriented X3, Good concentration; Moves all extremities  CHEST/LUNG: Clear to auscultation bilaterally  HEART: Regular rate and rhythm; Normal S1 and S2  ABDOMEN: Soft, Nontender, Nondistended; Bowel sounds present  EXTREMITIES:  2+ Peripheral Pulses, No clubbing, cyanosis, or edema  PSYCH: Normal mood and affect    Consultant(s) Notes Reviewed:  [x ] YES  [ ] NO  Care Discussed with Consultants/Other Providers [ x] YES  [ ] NO      Investigations:                        14.5   6.28  )-----------( 280      ( 30 Sep 2024 16:00 )             44.4     09-30    140  |  104  |  12  ----------------------------<  85  4.2   |  24  |  0.70    Ca    9.7      30 Sep 2024 16:00  Phos  3.1     09-30  Mg     2.0     09-30    TPro  8.1  /  Alb  4.9  /  TBili  0.4  /  DBili  x   /  AST  22  /  ALT  24  /  AlkPhos  69  09-30

## 2024-10-02 NOTE — PROGRESS NOTE ADULT - PROBLEM SELECTOR PLAN 1
Continue VEEG monitoring (09/30/24-   - Sleep deprivation, Photic Stimulation and hyperventilation yesterday 10/01/2024  - Neuro-psychology eval yesterday 10/01/2024  - Ativan 2mg IVP PRN for seizures > 2mins  - f/u Labs including Keppra and Lamotrigine levels  - Held AM meds: Keppra 1500mg and Lamotrigine 300mg 10/02/2024  - Will consider to add another AED  - Vitals & Neurological assessment Q8hrs  - Maintain Seizure/Fall/Aspiration precautions Continue VEEG monitoring (09/30/24-   - Sleep deprivation, Photic Stimulation and hyperventilation yesterday 10/01/2024  - Neuro-psychology eval yesterday 10/01/2024  - Ativan 2mg IVP PRN for seizures > 2mins  - f/u Labs including Keppra and Lamotrigine levels  - Will reduce Keppra by 50% and give 750mg BID   - Continue to give Lamotrigine 300mg BID  - Will consider to add another AED  - Vitals & Neurological assessment Q8hrs  - Maintain Seizure/Fall/Aspiration precautions  - Draw routine Labs tomorrow

## 2024-10-02 NOTE — PROGRESS NOTE ADULT - NS ATTEND AMEND GEN_ALL_CORE FT
Medications near maximized, and still having auras/seizures interfering with patient's career choices etc.    Plan, LEV to 750mg bid, LTG to remain the same, with potential goal of cross-taper for new medication.

## 2024-10-02 NOTE — PROGRESS NOTE ADULT - ASSESSMENT
Ms. 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     Recommendations:    #Left choroidal fissure cyst  #Epilepsy  - vEEG ongoing with medication adjustment per primary epilepsy team  - Neuro-Psych eval done  - Keppra and Lamotrigine levels  - seizure precautions  - management per epilepsy team    #Tobacco and cannabis use  - smoking cessation counseling  - SW referral    Feel free to reach out for any questions. Recommendations discussed with primary team.

## 2024-10-02 NOTE — PROGRESS NOTE ADULT - SUBJECTIVE AND OBJECTIVE BOX
EPILEPSY PROGRESS NOTE:  No events overnight. No auras but reported odd sensation again last night, did not remember what time, encouraged to press button. Denies any recent illness, travel, HA, dizziness, changes in vision, changes in bowel/bladder function, n/v.     REVIEW OF SYSTEMS:  As above, otherwise negative for constitutional/HEENT/CV/pulm/GI//MSK/neuro/derm/endocrine/psych.    MEDICATIONS:   MEDICATIONS  (STANDING):  folic acid 1 milliGRAM(s) Oral <User Schedule>  influenza   Vaccine 0.5 milliLiter(s) IntraMuscular once  lamoTRIgine 300 milliGRAM(s) Oral <User Schedule>  levETIRAcetam 1500 milliGRAM(s) Oral <User Schedule>    MEDICATIONS  (PRN):  acetaminophen     Tablet .. 650 milliGRAM(s) Oral every 6 hours PRN Temp greater or equal to 38.5C (101.3F), Mild Pain (1 - 3)  LORazepam   Injectable 2 milliGRAM(s) IV Push every 2 hours PRN Seizure Activity      VITAL SIGNS:  T(C): 36.7 (10-02-24 @ 06:25), Max: 36.8 (10-01-24 @ 11:55)  HR: 59 (10-02-24 @ 06:25) (59 - 72)  BP: 92/56 (10-02-24 @ 06:25) (92/56 - 118/69)  RR: 16 (10-02-24 @ 06:25) (16 - 16)  SpO2: 98% (10-02-24 @ 06:25) (97% - 98%)  Wt(kg): --    PHYSICAL EXAM:  General:  Young  woman, appearance is consistent with chronologic age. Conversational with appropriate sentences.  Cognitive/Language:  Awake, alert, and oriented to person, place, time, date and situation.  Recent and remote memory intact. Naming, repetition and comprehension intact. No dysarthria.    Cranial Nerves  - Eyes: Eyes open spontaneously. Visual acuity intact, Visual fields full.  EOMI w/o nystagmus. PERRLA. 3mm Brisk.  No ptosis/weakness of eyelid closure.    - Face: Facial sensation normal V1 - 3, no facial asymmetry.    - Ears/Nose/Throat:  Hearing grossly intact b/l to finger rub.  Palate elevates midline.  Tongue and uvula midline.   Motor exam: Normal tone and bulk. No tenderness, twitching, tremors or involuntary movements.   - MRC grading:          Upper extremity                  Bicep     Tricep     HG                                                 R      5/5        5/5          5/5                                                    L       5/5        5/5          5/5              Lower extremity                   HF        KE        DF         PF                                                  R     5/5       5/5       5/5       5/5                                               L      5/5      5/5       5/5        5/5  Sensory examination:  Intact to light touch in all extremities.  Reflexes: 2+ b/l biceps, triceps, patella and achilles.  Plantar response downgoing b/l.    Cerebellum: Finger-nose-finger intact.  No dysmetria.    Gait: deferred        LABS:  CBC Full  -  ( 30 Sep 2024 16:00 )  WBC Count : 6.28 K/uL  RBC Count : 5.00 M/uL  Hemoglobin : 14.5 g/dL  Hematocrit : 44.4 %  Platelet Count - Automated : 280 K/uL  Mean Cell Volume : 88.8 fl  Mean Cell Hemoglobin : 29.0 pg  Mean Cell Hemoglobin Concentration : 32.7 gm/dL  Auto Neutrophil # : 3.60 K/uL  Auto Lymphocyte # : 1.95 K/uL  Auto Monocyte # : 0.55 K/uL  Auto Eosinophil # : 0.13 K/uL  Auto Basophil # : 0.03 K/uL  Auto Neutrophil % : 57.2 %  Auto Lymphocyte % : 31.1 %  Auto Monocyte % : 8.8 %  Auto Eosinophil % : 2.1 %  Auto Basophil % : 0.5 %    09-30    140  |  104  |  12  ----------------------------<  85  4.2   |  24  |  0.70    Ca    9.7      30 Sep 2024 16:00  Phos  3.1     09-30  Mg     2.0     09-30    TPro  8.1  /  Alb  4.9  /  TBili  0.4  /  DBili  x   /  AST  22  /  ALT  24  /  AlkPhos  69  09-30    LIVER FUNCTIONS - ( 30 Sep 2024 16:00 )  Alb: 4.9 g/dL / Pro: 8.1 g/dL / ALK PHOS: 69 U/L / ALT: 24 U/L / AST: 22 U/L / GGT: x           PT/INR - ( 30 Sep 2024 16:00 )   PT: 10.5 sec;   INR: 0.90     PTT - ( 30 Sep 2024 16:00 )  PTT:31.0 sec      EEG:  Day 2  Oct 1-2, 2024:   Background:  continuous, with predominantly alpha and beta frequencies.  Voltage:  Normal (20+ uV)  Organization:  Appropriate anterior-posterior gradient  Posterior Dominant Rhythm:  10 Hz symmetric, well-organized, and well-modulated  Sleep:  Symmetric, synchronous spindles and K complexes.  Focal abnormalities:  No persistent asymmetries of voltage or frequency.  Spontaneous Activity:  Occasional ( >1/hr < 1/min)  Left frontotemporal (F7)  Epileptiform sharp waves ( msec) during sleep.   Events:  1 push button activation without change in EEG background:  d1 18:24:18		pauses here  d1 18:24:23		Patient Event activated  Provocations:  •	Hyperventilation @ 3:59 PM: pt felt numb, but HV did not evoke EEG abnormalities.  •	Photic stimulation: was not performed.  Daily Summary:    •	There were epileptiform discharges over the left fronto-temporal region perhaps slightly more obvious especially during sleep, maximal at F7.  •	One small version of the patient's typical events was recorded and was without an electrographic correlate.    Day 1 9/30/2024, 4:21:24 PM to next morning at 07:00 AM.  Background:  continuous, with predominantly alpha and beta frequencies.  Voltage:  Normal (20+ uV)  Organization:  Appropriate anterior-posterior gradient  Posterior Dominant Rhythm:  10 Hz symmetric, well-organized, and well-modulated  Sleep:  Symmetric, synchronous spindles and K complexes.  Focal abnormalities:  No persistent asymmetries of voltage or frequency.  Spontaneous Activity:  Occasional ( >1/hr < 1/min)  Left frontotemporal (F7)  Epileptiform sharp waves ( msec)   Events:  •	No electrographic seizures or significant clinical events occurred during this study.  Provocations:  •	Hyperventilation: did not evoke EEG abnormalities.  •	Photic stimulation: was not performed.  Daily Summary:    •	There were epileptiform discharges over the left fronto-temporal region, maximal at F7.  •	The patient's typical events were not recorded during the study.      RADIOLOGY:  MR Brain-Seizure, Epilepsy w/wo IV Cont (09.18.24 @ 15:26)    IMPRESSION:  1. 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   EPILEPSY PROGRESS NOTE:  No events overnight. Reported auras yesterday around 6:30PM and an odd sensation again last night at 2:43AM, encouraged to continue to press button for Auras. Denies any recent illness, travel, HA, dizziness, changes in vision, changes in bowel/bladder function, n/v.     REVIEW OF SYSTEMS:  As above, otherwise negative for constitutional/HEENT/CV/pulm/GI//MSK/neuro/derm/endocrine/psych.    MEDICATIONS:   MEDICATIONS  (STANDING):  folic acid 1 milliGRAM(s) Oral <User Schedule>  influenza   Vaccine 0.5 milliLiter(s) IntraMuscular once  lamoTRIgine 300 milliGRAM(s) Oral <User Schedule>  levETIRAcetam 1500 milliGRAM(s) Oral <User Schedule>    MEDICATIONS  (PRN):  acetaminophen     Tablet .. 650 milliGRAM(s) Oral every 6 hours PRN Temp greater or equal to 38.5C (101.3F), Mild Pain (1 - 3)  LORazepam   Injectable 2 milliGRAM(s) IV Push every 2 hours PRN Seizure Activity      VITAL SIGNS:  T(C): 36.7 (10-02-24 @ 06:25), Max: 36.8 (10-01-24 @ 11:55)  HR: 59 (10-02-24 @ 06:25) (59 - 72)  BP: 92/56 (10-02-24 @ 06:25) (92/56 - 118/69)  RR: 16 (10-02-24 @ 06:25) (16 - 16)  SpO2: 98% (10-02-24 @ 06:25) (97% - 98%)    PHYSICAL EXAM:  General:  Young  woman, appearance is consistent with chronologic age. Conversational with appropriate sentences.  Cognitive/Language:  Awake, alert, and oriented to person, place, time, date and situation.  Recent and remote memory intact. Naming, repetition and comprehension intact. No dysarthria.    Cranial Nerves  - Eyes: Eyes open spontaneously. Visual acuity intact, Visual fields full.  EOMI w/o nystagmus. PERRLA. 3mm Brisk.  No ptosis/weakness of eyelid closure.    - Face: Facial sensation normal V1 - 3, no facial asymmetry.    - Ears/Nose/Throat:  Hearing grossly intact b/l to finger rub.  Palate elevates midline.  Tongue and uvula midline.   Motor exam: Normal tone and bulk. No tenderness, twitching, tremors or involuntary movements.   - MRC grading:          Upper extremity                  Bicep     Tricep     HG                                                 R      5/5        5/5          5/5                                                    L       5/5        5/5          5/5              Lower extremity                   HF        KE        DF         PF                                                  R     5/5       5/5       5/5       5/5                                               L      5/5      5/5       5/5        5/5  Sensory examination:  Intact to light touch in all extremities.  Reflexes: 2+ b/l biceps, triceps, patella and achilles.  Plantar response downgoing b/l.    Cerebellum: Finger-nose-finger intact.  No dysmetria.    Gait: deferred        LABS:  CBC Full  -  ( 30 Sep 2024 16:00 )  WBC Count : 6.28 K/uL  RBC Count : 5.00 M/uL  Hemoglobin : 14.5 g/dL  Hematocrit : 44.4 %  Platelet Count - Automated : 280 K/uL  Mean Cell Volume : 88.8 fl  Mean Cell Hemoglobin : 29.0 pg  Mean Cell Hemoglobin Concentration : 32.7 gm/dL  Auto Neutrophil # : 3.60 K/uL  Auto Lymphocyte # : 1.95 K/uL  Auto Monocyte # : 0.55 K/uL  Auto Eosinophil # : 0.13 K/uL  Auto Basophil # : 0.03 K/uL  Auto Neutrophil % : 57.2 %  Auto Lymphocyte % : 31.1 %  Auto Monocyte % : 8.8 %  Auto Eosinophil % : 2.1 %  Auto Basophil % : 0.5 %    09-30    140  |  104  |  12  ----------------------------<  85  4.2   |  24  |  0.70    Ca    9.7      30 Sep 2024 16:00  Phos  3.1     09-30  Mg     2.0     09-30    TPro  8.1  /  Alb  4.9  /  TBili  0.4  /  DBili  x   /  AST  22  /  ALT  24  /  AlkPhos  69  09-30    LIVER FUNCTIONS - ( 30 Sep 2024 16:00 )  Alb: 4.9 g/dL / Pro: 8.1 g/dL / ALK PHOS: 69 U/L / ALT: 24 U/L / AST: 22 U/L / GGT: x           PT/INR - ( 30 Sep 2024 16:00 )   PT: 10.5 sec;   INR: 0.90     PTT - ( 30 Sep 2024 16:00 )  PTT:31.0 sec      EEG:  Day 2  Oct 1-2, 2024:   Background:  continuous, with predominantly alpha and beta frequencies.  Voltage:  Normal (20+ uV)  Organization:  Appropriate anterior-posterior gradient  Posterior Dominant Rhythm:  10 Hz symmetric, well-organized, and well-modulated  Sleep:  Symmetric, synchronous spindles and K complexes.  Focal abnormalities:  No persistent asymmetries of voltage or frequency.  Spontaneous Activity:  Occasional ( >1/hr < 1/min)  Left frontotemporal (F7)  Epileptiform sharp waves ( msec) during sleep.   Events:  1 push button activation without change in EEG background:  d1 18:24:18		pauses here  d1 18:24:23		Patient Event activated  Provocations:  •	Hyperventilation @ 3:59 PM: pt felt numb, but HV did not evoke EEG abnormalities.  •	Photic stimulation: was not performed.  Daily Summary:    •	There were epileptiform discharges over the left fronto-temporal region perhaps slightly more obvious especially during sleep, maximal at F7.  •	One small version of the patient's typical events was recorded and was without an electrographic correlate.    Day 1 9/30/2024, 4:21:24 PM to next morning at 07:00 AM.  Background:  continuous, with predominantly alpha and beta frequencies.  Voltage:  Normal (20+ uV)  Organization:  Appropriate anterior-posterior gradient  Posterior Dominant Rhythm:  10 Hz symmetric, well-organized, and well-modulated  Sleep:  Symmetric, synchronous spindles and K complexes.  Focal abnormalities:  No persistent asymmetries of voltage or frequency.  Spontaneous Activity:  Occasional ( >1/hr < 1/min)  Left frontotemporal (F7)  Epileptiform sharp waves ( msec)   Events:  •	No electrographic seizures or significant clinical events occurred during this study.  Provocations:  •	Hyperventilation: did not evoke EEG abnormalities.  •	Photic stimulation: was not performed.  Daily Summary:    •	There were epileptiform discharges over the left fronto-temporal region, maximal at F7.  •	The patient's typical events were not recorded during the study.      RADIOLOGY:  MR Brain-Seizure, Epilepsy w/wo IV Cont (09.18.24 @ 15:26)    IMPRESSION:  1. 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   EPILEPSY PROGRESS NOTE:  No events overnight. Reported auras yesterday around 6:30PM and an odd sensation again last night at 2:43AM, encouraged to continue to press button for Auras. Denies any recent illness, travel, HA, dizziness, changes in vision, changes in bowel/bladder function, n/v.     No definite EEG correlate.  Continued and more frequent F7 spikes overnight.    REVIEW OF SYSTEMS:  As above, otherwise negative for constitutional/HEENT/CV/pulm/GI//MSK/neuro/derm/endocrine/psych.    MEDICATIONS:   MEDICATIONS  (STANDING):  folic acid 1 milliGRAM(s) Oral <User Schedule>  influenza   Vaccine 0.5 milliLiter(s) IntraMuscular once  lamoTRIgine 300 milliGRAM(s) Oral <User Schedule>  levETIRAcetam 1500 milliGRAM(s) Oral <User Schedule>    MEDICATIONS  (PRN):  acetaminophen     Tablet .. 650 milliGRAM(s) Oral every 6 hours PRN Temp greater or equal to 38.5C (101.3F), Mild Pain (1 - 3)  LORazepam   Injectable 2 milliGRAM(s) IV Push every 2 hours PRN Seizure Activity      VITAL SIGNS:  T(C): 36.7 (10-02-24 @ 06:25), Max: 36.8 (10-01-24 @ 11:55)  HR: 59 (10-02-24 @ 06:25) (59 - 72)  BP: 92/56 (10-02-24 @ 06:25) (92/56 - 118/69)  RR: 16 (10-02-24 @ 06:25) (16 - 16)  SpO2: 98% (10-02-24 @ 06:25) (97% - 98%)    PHYSICAL EXAM:  General:  Young  woman, appearance is consistent with chronologic age. Conversational with appropriate sentences.  Cognitive/Language:  Awake, alert, and oriented to person, place, time, date and situation.  Recent and remote memory intact. Naming, repetition and comprehension intact. No dysarthria.    Cranial Nerves  - Eyes: Eyes open spontaneously. Visual acuity intact, Visual fields full.  EOMI w/o nystagmus. PERRLA. 3mm Brisk.  No ptosis/weakness of eyelid closure.    - Face: Facial sensation normal V1 - 3, no facial asymmetry.    - Ears/Nose/Throat:  Hearing grossly intact b/l to finger rub.  Palate elevates midline.  Tongue and uvula midline.   Motor exam: Normal tone and bulk. No tenderness, twitching, tremors or involuntary movements.   - MRC grading:          Upper extremity                  Bicep     Tricep     HG                                                 R      5/5        5/5          5/5                                                    L       5/5        5/5          5/5              Lower extremity                   HF        KE        DF         PF                                                  R     5/5       5/5       5/5       5/5                                               L      5/5      5/5       5/5        5/5  Sensory examination:  Intact to light touch in all extremities.  Reflexes: 2+ b/l biceps, triceps, patella and achilles.  Plantar response downgoing b/l.    Cerebellum: Finger-nose-finger intact.  No dysmetria.    Gait: deferred        LABS:  CBC Full  -  ( 30 Sep 2024 16:00 )  WBC Count : 6.28 K/uL  RBC Count : 5.00 M/uL  Hemoglobin : 14.5 g/dL  Hematocrit : 44.4 %  Platelet Count - Automated : 280 K/uL  Mean Cell Volume : 88.8 fl  Mean Cell Hemoglobin : 29.0 pg  Mean Cell Hemoglobin Concentration : 32.7 gm/dL  Auto Neutrophil # : 3.60 K/uL  Auto Lymphocyte # : 1.95 K/uL  Auto Monocyte # : 0.55 K/uL  Auto Eosinophil # : 0.13 K/uL  Auto Basophil # : 0.03 K/uL  Auto Neutrophil % : 57.2 %  Auto Lymphocyte % : 31.1 %  Auto Monocyte % : 8.8 %  Auto Eosinophil % : 2.1 %  Auto Basophil % : 0.5 %    09-30    140  |  104  |  12  ----------------------------<  85  4.2   |  24  |  0.70    Ca    9.7      30 Sep 2024 16:00  Phos  3.1     09-30  Mg     2.0     09-30    TPro  8.1  /  Alb  4.9  /  TBili  0.4  /  DBili  x   /  AST  22  /  ALT  24  /  AlkPhos  69  09-30    LIVER FUNCTIONS - ( 30 Sep 2024 16:00 )  Alb: 4.9 g/dL / Pro: 8.1 g/dL / ALK PHOS: 69 U/L / ALT: 24 U/L / AST: 22 U/L / GGT: x           PT/INR - ( 30 Sep 2024 16:00 )   PT: 10.5 sec;   INR: 0.90     PTT - ( 30 Sep 2024 16:00 )  PTT:31.0 sec      EEG:  Day 2  Oct 1-2, 2024:   Background:  continuous, with predominantly alpha and beta frequencies.  Voltage:  Normal (20+ uV)  Organization:  Appropriate anterior-posterior gradient  Posterior Dominant Rhythm:  10 Hz symmetric, well-organized, and well-modulated  Sleep:  Symmetric, synchronous spindles and K complexes.  Focal abnormalities:  No persistent asymmetries of voltage or frequency.  Spontaneous Activity:  Occasional ( >1/hr < 1/min)  Left frontotemporal (F7)  Epileptiform sharp waves ( msec) during sleep.   Events:  1 push button activation without change in EEG background:  d1 18:24:18		pauses here  d1 18:24:23		Patient Event activated  Provocations:  •	Hyperventilation @ 3:59 PM: pt felt numb, but HV did not evoke EEG abnormalities.  •	Photic stimulation: was not performed.  Daily Summary:    •	There were epileptiform discharges over the left fronto-temporal region perhaps slightly more obvious especially during sleep, maximal at F7.  •	One small version of the patient's typical events was recorded and was without an electrographic correlate.    Day 1 9/30/2024, 4:21:24 PM to next morning at 07:00 AM.  Background:  continuous, with predominantly alpha and beta frequencies.  Voltage:  Normal (20+ uV)  Organization:  Appropriate anterior-posterior gradient  Posterior Dominant Rhythm:  10 Hz symmetric, well-organized, and well-modulated  Sleep:  Symmetric, synchronous spindles and K complexes.  Focal abnormalities:  No persistent asymmetries of voltage or frequency.  Spontaneous Activity:  Occasional ( >1/hr < 1/min)  Left frontotemporal (F7)  Epileptiform sharp waves ( msec)   Events:  •	No electrographic seizures or significant clinical events occurred during this study.  Provocations:  •	Hyperventilation: did not evoke EEG abnormalities.  •	Photic stimulation: was not performed.  Daily Summary:    •	There were epileptiform discharges over the left fronto-temporal region, maximal at F7.  •	The patient's typical events were not recorded during the study.      RADIOLOGY:  MR Brain-Seizure, Epilepsy w/wo IV Cont (09.18.24 @ 15:26)    IMPRESSION:  1. 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

## 2024-10-02 NOTE — PROGRESS NOTE ADULT - ASSESSMENT
29-year-old right-handed Female with PMHx of seizures presents for assessment of level of seizure and possible medication adjustment while undergoing vEEG monitoring. Patient reports multiple episodes of feeling lion vu and heart racing (auras) in 2021, that she originally thought were panic attacks and in January 2022 had her first GTC seizure. In April 2022 she was diagnosed with Epilepsy. Her most recent seizure was in July 2023 and since then she had multiple auras in 2024 despite being on Keppra 1500mg BID and Lamotrigine 400mg/300mg. vEEG placed on 09/29/2024 shows epileptiform discharges over the left fronto-temporal region thus far.

## 2024-10-02 NOTE — EEG REPORT - NS EEG TEXT BOX
[facility] Department of Neurology  Inpatient Epilepsy Monitoring Unit video-Electroencephalography Report    Acquisition Details:  Electroencephalography was acquired using a minimum of 21 channels on an [employer] system v 9.3.1 with electrode placement according to the standard International 10-20 system following ACNS (American Clinical Neurophysiology Society) guidelines for Long-Term Video EEG monitoring.  Anterior temporal T1 and T2 electrodes were utilized whenever possible.   The [employer] automated spike & seizure detections were all reviewed in detail, in addition to extensive portions of raw EEG.  Specially-trained nurses were available for seizure-related events.  Continuous live-time video monitoring of the patients for seizure-related and safety events was performed by specially-trained technicians.        Daily Updates (from 07:00 am until 07:00 am):  Day 2  Oct 1-2, 2024:   Background:  continuous, with predominantly alpha and beta frequencies.  Voltage:  Normal (20+ uV)  Organization:  Appropriate anterior-posterior gradient  Posterior Dominant Rhythm:  10 Hz symmetric, well-organized, and well-modulated  Sleep:  Symmetric, synchronous spindles and K complexes.  Focal abnormalities:  No persistent asymmetries of voltage or frequency.  Spontaneous Activity:  Occasional ( >1/hr < 1/min)  Left frontotemporal (F7)  Epileptiform sharp waves ( msec) during sleep.   Events:  1 push button activation without change in EEG background:  d1 18:24:18		pauses here  d1 18:24:23		Patient Event activated  Provocations:  •	Hyperventilation @ 3:59 PM: pt felt numb, but HV did not evoke EEG abnormalities.  •	Photic stimulation: was not performed.  Daily Summary:    •	There were epileptiform discharges over the left fronto-temporal region perhaps slightly more obvious especially during sleep, maximal at F7.  •	One small version of the patient's typical events was recorded and was without an electrographic correlate.      Mariano Ziegler MD  Attending Neurologist, [facility]

## 2024-10-03 LAB
ANION GAP SERPL CALC-SCNC: 9 MMOL/L — SIGNIFICANT CHANGE UP (ref 5–17)
BUN SERPL-MCNC: 16 MG/DL — SIGNIFICANT CHANGE UP (ref 7–23)
CALCIUM SERPL-MCNC: 9.5 MG/DL — SIGNIFICANT CHANGE UP (ref 8.4–10.5)
CHLORIDE SERPL-SCNC: 107 MMOL/L — SIGNIFICANT CHANGE UP (ref 96–108)
CO2 SERPL-SCNC: 26 MMOL/L — SIGNIFICANT CHANGE UP (ref 22–31)
CREAT SERPL-MCNC: 0.85 MG/DL — SIGNIFICANT CHANGE UP (ref 0.5–1.3)
EGFR: 94 ML/MIN/1.73M2 — SIGNIFICANT CHANGE UP
GLUCOSE SERPL-MCNC: 86 MG/DL — SIGNIFICANT CHANGE UP (ref 70–99)
HCT VFR BLD CALC: 44 % — SIGNIFICANT CHANGE UP (ref 34.5–45)
HGB BLD-MCNC: 14.1 G/DL — SIGNIFICANT CHANGE UP (ref 11.5–15.5)
LAMOTRIGINE SERPL-MCNC: 9.2 UG/ML — SIGNIFICANT CHANGE UP (ref 2–20)
LEVETIRACETAM SERPL-MCNC: 26.6 UG/ML — SIGNIFICANT CHANGE UP (ref 10–40)
MCHC RBC-ENTMCNC: 27.9 PG — SIGNIFICANT CHANGE UP (ref 27–34)
MCHC RBC-ENTMCNC: 32 GM/DL — SIGNIFICANT CHANGE UP (ref 32–36)
MCV RBC AUTO: 87.1 FL — SIGNIFICANT CHANGE UP (ref 80–100)
NRBC # BLD: 0 /100 WBCS — SIGNIFICANT CHANGE UP (ref 0–0)
PLATELET # BLD AUTO: 291 K/UL — SIGNIFICANT CHANGE UP (ref 150–400)
POTASSIUM SERPL-MCNC: 4.3 MMOL/L — SIGNIFICANT CHANGE UP (ref 3.5–5.3)
POTASSIUM SERPL-SCNC: 4.3 MMOL/L — SIGNIFICANT CHANGE UP (ref 3.5–5.3)
RBC # BLD: 5.05 M/UL — SIGNIFICANT CHANGE UP (ref 3.8–5.2)
RBC # FLD: 13 % — SIGNIFICANT CHANGE UP (ref 10.3–14.5)
SODIUM SERPL-SCNC: 142 MMOL/L — SIGNIFICANT CHANGE UP (ref 135–145)
WBC # BLD: 7.82 K/UL — SIGNIFICANT CHANGE UP (ref 3.8–10.5)
WBC # FLD AUTO: 7.82 K/UL — SIGNIFICANT CHANGE UP (ref 3.8–10.5)

## 2024-10-03 PROCEDURE — 99233 SBSQ HOSP IP/OBS HIGH 50: CPT

## 2024-10-03 PROCEDURE — 95720 EEG PHY/QHP EA INCR W/VEEG: CPT

## 2024-10-03 RX ORDER — LEVETIRACETAM 1000 MG
250 TABLET ORAL ONCE
Refills: 0 | Status: COMPLETED | OUTPATIENT
Start: 2024-10-03 | End: 2024-10-03

## 2024-10-03 RX ORDER — LAMOTRIGINE 25 MG/1
300 TABLET ORAL ONCE
Refills: 0 | Status: COMPLETED | OUTPATIENT
Start: 2024-10-03 | End: 2024-10-03

## 2024-10-03 RX ORDER — LAMOTRIGINE 25 MG/1
150 TABLET ORAL
Refills: 0 | Status: DISCONTINUED | OUTPATIENT
Start: 2024-10-04 | End: 2024-10-04

## 2024-10-03 RX ADMIN — Medication 250 MILLIGRAM(S): at 21:11

## 2024-10-03 RX ADMIN — Medication 750 MILLIGRAM(S): at 08:41

## 2024-10-03 RX ADMIN — FOLIC ACID 1 MILLIGRAM(S): 1 TABLET ORAL at 08:41

## 2024-10-03 RX ADMIN — LAMOTRIGINE 300 MILLIGRAM(S): 25 TABLET ORAL at 21:10

## 2024-10-03 RX ADMIN — LAMOTRIGINE 300 MILLIGRAM(S): 25 TABLET ORAL at 08:41

## 2024-10-03 NOTE — PROGRESS NOTE ADULT - PROBLEM SELECTOR PLAN 1
Continue VEEG monitoring   - cont to taper Levetiracetam w/ last dose 250mg tonight then d/c  - will give last 300mg Lamotrigine tonight and break up frequency to reduce acute s/e  - 10/04 start Lamotrigine 150mg po TID (0900 - 1500 - 2100)  - Ativan 2mg IVP PRN for seizures > 2mins  - f/u Labs including Keppra and Lamotrigine levels  - Vitals & Neurological assessment Q8hrs  - Maintain Seizure/Fall/Aspiration precautions

## 2024-10-03 NOTE — DIETITIAN INITIAL EVALUATION ADULT - PERTINENT LABORATORY DATA
10-03    142  |  107  |  16  ----------------------------<  86  4.3   |  26  |  0.85    Ca    9.5      03 Oct 2024 05:30

## 2024-10-03 NOTE — EEG REPORT - NS EEG TEXT BOX
[facility] Department of Neurology  Inpatient Continuous video-Electroencephalography Report    Acquisition Details:  Electroencephalography was acquired using a minimum of 21 channels on an [employer] system v 9.3.1 with electrode placement according to the standard International 10-20 system following ACNS (American Clinical Neurophysiology Society) guidelines for Long-Term Video EEG monitoring.  Anterior temporal T1 and T2 electrodes were utilized whenever possible.   The [employer] automated spike & seizure detections were all reviewed in detail, in addition to extensive portions of raw EEG.        Daily Updates (from 07:00 am until 07:00 am):  Day 3  Oct 2-3, 2024:   Medications:  LEV ->750mg bid, LTG remained 300mg bid  Background:  continuous, with predominantly alpha and beta frequencies.  Voltage:  Normal (20+ uV)  Organization:  Appropriate anterior-posterior gradient  Posterior Dominant Rhythm:  10 Hz symmetric, well-organized, and well-modulated  Sleep:  Symmetric, synchronous spindles and K complexes.  Focal abnormalities:  No persistent asymmetries of voltage or frequency.  Spontaneous Activity:  Occasional ( >1/hr < 1/min)  Left frontotemporal (F7)  Epileptiform sharp waves ( msec) during sleep.   Events:  No definite clinical or electrographic events.  Provocations:  •	Hyperventilation: was not performed.  •	Photic stimulation: did not evoke EEG abnormalities.  Daily Summary:    •	There were epileptiform discharges over the left fronto-temporal region perhaps slightly more obvious especially during sleep, maximal at F7.  •	None of the patient's typical events were recorded.    Mariano Ziegler MD  Attending Neurologist, [facility]

## 2024-10-03 NOTE — PROGRESS NOTE ADULT - SUBJECTIVE AND OBJECTIVE BOX
Interval History:    She reported some dizziness after getting up to use the bathroom yesterday. this only happened once. No reports of Auras overnight. Woke up feeling great this morning.     HPI:  29-year-old right-handed Female with PMHx of seizures presents for a pre-surgical evaluation while undergoing vEEG monitoring. Patient reports multiple episodes of feeling lion vu and heart racing (auras) in 2021, after her Grandmother passed that she originally thought were panic attacks. Then she had her first GTC seizure  in January 2022 and another in April 2022 (diagnosed with Epilepsy at this time) after brother passed in March 2022. Her most recent seizure was in July 2023 and since then she had multiple auras in 2024 despite being on Keppra 1500mg BID and Lamotrigine 400mg/300mg however for the past 4 days the Pharmacy was unable to fulfill Lamotrigine 100mg tabs so she did not take them in the morning. She is unaware of the duration of her seizures and reports confusion immediately following and usually comes to when the ambulance/EMS arrives. Patient reports seizures with fall and convulsion but denies tongue biting, foaming of the mouth or having urinary or fecal incontinence. 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 No electrographic seizures recorded." Patient reports Family Hx of seizures in her Maternal Uncle and her Father has a h/o convulsing and passing out likely from panic attack. Her triggers include extreme emotions, stress, andinconsistent sleep schedule. The patient has no known h/o febrile seizures or CNS infection. Denies any recent illness, travel, HA, dizziness, changes in vision, changes in bowel/bladder function, n/v.    (30 Sep 2024 15:19)      PAST MEDICAL & SURGICAL HISTORY:  Epilepsy    Medications:  acetaminophen     Tablet .. 650 milliGRAM(s) Oral every 6 hours PRN  folic acid 1 milliGRAM(s) Oral <User Schedule>  influenza   Vaccine 0.5 milliLiter(s) IntraMuscular once  Levetiracetam 750mg BID  Lamotrigine 300mg BID  LORazepam   Injectable 2 milliGRAM(s) IV Push every 2 hours PRN      Vital Signs Last 24 Hrs  T(C): 36.4 (03 Oct 2024 12:22), Max: 37.1 (02 Oct 2024 20:41)  T(F): 97.5 (03 Oct 2024 12:22), Max: 98.7 (02 Oct 2024 20:41)  HR: 68 (03 Oct 2024 12:22) (68 - 77)  BP: 104/61 (03 Oct 2024 12:22) (102/54 - 115/71)  BP(mean): 70 (03 Oct 2024 05:59) (70 - 85)  RR: 16 (03 Oct 2024 12:22) (16 - 18)  SpO2: 98% (03 Oct 2024 12:22) (97% - 98%)    Parameters below as of 03 Oct 2024 12:22  Patient On (Oxygen Delivery Method): room air        Neurological Exam:   Mental status: Awake, alert and oriented x3.  Recent and remote memory intact.  Naming, repetition and comprehension intact.  No dysarthria, no aphasia.  Fund of knowledge appropriate.    Cranial nerves: Pupils equally round and reactive to light, visual fields full, no nystagmus, extraocular muscles intact, V1 through V3 intact bilaterally and symmetric, face symmetric, hearing intact to finger rub, palate elevation symmetric, tongue was midline.  Motor:  MRC grading 5/5 b/l UE/LE.   Normal tone and bulk.  No abnormal movements.    Sensation: Intact to light touch in all extremities  Coordination: No dysmetria on finger-to-nose and heel-to-shin  Reflexes: 2+ in bilateral UE/LE, downgoing toes bilaterally. (-) Griffin. clonus absent   Gait: deferred     Labs:  CBC Full  -  ( 03 Oct 2024 05:30 )  WBC Count : 7.82 K/uL  RBC Count : 5.05 M/uL  Hemoglobin : 14.1 g/dL  Hematocrit : 44.0 %  Platelet Count - Automated : 291 K/uL  Mean Cell Volume : 87.1 fl  Mean Cell Hemoglobin : 27.9 pg  Mean Cell Hemoglobin Concentration : 32.0 gm/dL      10-03    142  |  107  |  16  ----------------------------<  86  4.3   |  26  |  0.85    Ca    9.5      03 Oct 2024 05:30           Interval History:    She reported some dizziness after getting up to use the bathroom yesterday.  This only happened once. No reports of Auras overnight. Woke up feeling great this morning.     HPI:  29-year-old right-handed Female with PMHx of seizures presents for a pre-surgical evaluation while undergoing vEEG monitoring. Patient reports multiple episodes of feeling lion vu and heart racing (auras) in 2021, after her Grandmother passed that she originally thought were panic attacks. Then she had her first GTC seizure  in January 2022 and another in April 2022 (diagnosed with Epilepsy at this time) after brother passed in March 2022. Her most recent seizure was in July 2023 and since then she had multiple auras in 2024 despite being on Keppra 1500mg BID and Lamotrigine 400mg/300mg however for the past 4 days the Pharmacy was unable to fulfill Lamotrigine 100mg tabs so she did not take them in the morning. She is unaware of the duration of her seizures and reports confusion immediately following and usually comes to when the ambulance/EMS arrives. Patient reports seizures with fall and convulsion but denies tongue biting, foaming of the mouth or having urinary or fecal incontinence. 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 No electrographic seizures recorded." Patient reports Family Hx of seizures in her Maternal Uncle and her Father has a h/o convulsing and passing out likely from panic attack. Her triggers include extreme emotions, stress, andinconsistent sleep schedule. The patient has no known h/o febrile seizures or CNS infection. Denies any recent illness, travel, HA, dizziness, changes in vision, changes in bowel/bladder function, n/v.    (30 Sep 2024 15:19)      PAST MEDICAL & SURGICAL HISTORY:  Epilepsy    Medications:  acetaminophen     Tablet .. 650 milliGRAM(s) Oral every 6 hours PRN  folic acid 1 milliGRAM(s) Oral <User Schedule>  influenza   Vaccine 0.5 milliLiter(s) IntraMuscular once  Levetiracetam 750mg BID  Lamotrigine 300mg BID  LORazepam   Injectable 2 milliGRAM(s) IV Push every 2 hours PRN      Vital Signs Last 24 Hrs  T(C): 36.4 (03 Oct 2024 12:22), Max: 37.1 (02 Oct 2024 20:41)  T(F): 97.5 (03 Oct 2024 12:22), Max: 98.7 (02 Oct 2024 20:41)  HR: 68 (03 Oct 2024 12:22) (68 - 77)  BP: 104/61 (03 Oct 2024 12:22) (102/54 - 115/71)  BP(mean): 70 (03 Oct 2024 05:59) (70 - 85)  RR: 16 (03 Oct 2024 12:22) (16 - 18)  SpO2: 98% (03 Oct 2024 12:22) (97% - 98%)    Parameters below as of 03 Oct 2024 12:22  Patient On (Oxygen Delivery Method): room air        Neurological Exam:   Mental status: Awake, alert and oriented x3.  Recent and remote memory intact.  Naming, repetition and comprehension intact.  No dysarthria, no aphasia.  Fund of knowledge appropriate.    Cranial nerves: Pupils equally round and reactive to light, visual fields full, no nystagmus, extraocular muscles intact, V1 through V3 intact bilaterally and symmetric, face symmetric, hearing intact to finger rub, palate elevation symmetric, tongue was midline.  Motor:  MRC grading 5/5 b/l UE/LE.   Normal tone and bulk.  No abnormal movements.    Sensation: Intact to light touch in all extremities  Coordination: No dysmetria on finger-to-nose and heel-to-shin  Reflexes: 2+ in bilateral UE/LE, downgoing toes bilaterally. (-) Griffin. clonus absent   Gait: deferred     Labs:  CBC Full  -  ( 03 Oct 2024 05:30 )  WBC Count : 7.82 K/uL  RBC Count : 5.05 M/uL  Hemoglobin : 14.1 g/dL  Hematocrit : 44.0 %  Platelet Count - Automated : 291 K/uL  Mean Cell Volume : 87.1 fl  Mean Cell Hemoglobin : 27.9 pg  Mean Cell Hemoglobin Concentration : 32.0 gm/dL      10-03    142  |  107  |  16  ----------------------------<  86  4.3   |  26  |  0.85    Ca    9.5      03 Oct 2024 05:30

## 2024-10-03 NOTE — DIETITIAN INITIAL EVALUATION ADULT - EDUCATION DIETARY MODIFICATIONS
Educated on strategies to promote good nutritional status in setting of variable appetite, encouraged good intake of fluids and fiber for bowel regularity. Pt aware RD remains available for additional questions/concerns./(2) meets goals/outcomes/verbalization

## 2024-10-03 NOTE — DIETITIAN INITIAL EVALUATION ADULT - OTHER INFO
29F with PMH of left choroidal fissure cyst and seizure disorder who admitted for vEEG monitoring.     Pt seen on 7WO for assessment. Labs and medication orders reviewed. Ordered for folic acid. Electrolytes WNL. On Regular diet. Pt resting comfortably in bed this AM. Reports fair intake at baseline in setting of work/life stress and variable appetite, endorses improved appetite and consistently good PO intake since admission. Pt denies difficulty chewing/swallowing. Reports UBW ~115lb, notes recent wt gain consistent with admission wt 120lb/54.5kg. No overt signs of wasting identified. Pt denies nausea/vomiting/diarrhea, reports constipation during admission relieved with BM yesterday 10/2. Confirms no known food allergies. No Ep[employer]opalian/ethnic/cultural food preferences noted. No pressure injuries or edema documented, surgical incision noted, Yasir score 20. See nutrition recommendations. RD to remain available.

## 2024-10-03 NOTE — PROGRESS NOTE ADULT - SUBJECTIVE AND OBJECTIVE BOX
Patient is a 29y old  Female who presents with a chief complaint of seizure (02 Oct 2024 13:39).    Patient seen and examined today. She reports no events overnight and she had no auras. She denies headache, blurring of vision, focal weakness, numbness. She had some dizziness when she stood up abruptly yesterday which is now resolved when she walked.    Allergies    No Known Allergies    Last Bowel Movement: 29-Sep-2024 (10-01-24 @ 12:23)      MEDICATIONS  (STANDING):  folic acid 1 milliGRAM(s) Oral <User Schedule>  influenza   Vaccine 0.5 milliLiter(s) IntraMuscular once  lamoTRIgine 300 milliGRAM(s) Oral <User Schedule>  levETIRAcetam 750 milliGRAM(s) Oral <User Schedule>    MEDICATIONS  (PRN):  acetaminophen     Tablet .. 650 milliGRAM(s) Oral every 6 hours PRN Temp greater or equal to 38.5C (101.3F), Mild Pain (1 - 3)  LORazepam   Injectable 2 milliGRAM(s) IV Push every 2 hours PRN Seizure Activity      Vital Signs Last 24 Hrs  T(C): 36.5 (10-03-24 @ 05:59), Max: 37.1 (10-02-24 @ 20:41)  T(F): 97.7 (10-03-24 @ 05:59), Max: 98.7 (10-02-24 @ 20:41)  HR: 77 (10-03-24 @ 05:59) (64 - 77)  BP: 102/54 (10-03-24 @ 05:59) (102/54 - 115/71)  BP(mean): 70 (10-03-24 @ 05:59) (70 - 85)  RR: 18 (10-03-24 @ 05:59) (18 - 20)  SpO2: 98% (10-03-24 @ 05:59) (97% - 98%)      I&O's Summary    Oxygen Saturation Index= Unable to calculate   [Based on FiO2 = Unknown, SpO2 = 98(10/03/2024 05:59), MAP = Unknown]    PHYSICAL EXAM:  GENERAL: NAD  HEAD:  vEEG leads on   EYES: EOMI, conjunctiva and sclera clear  ENMT: Moist mucous membranes  NECK: Supple, No JVD  NERVOUS SYSTEM:  Alert & Oriented X3, Moves all extremities  CHEST/LUNG: Clear to auscultation bilaterally  HEART: Regular rate and rhythm; Normal S1 and S2  ABDOMEN: Soft, Nontender, Nondistended; Bowel sounds present  EXTREMITIES:  2+ Peripheral Pulses, No clubbing, cyanosis, or edema  PSYCH: Normal mood and affect      Consultant(s) Notes Reviewed:  [x ] YES  [ ] NO  Care Discussed with Consultants/Other Providers [ x] YES  [ ] NO      Investigations:                        14.1   7.82  )-----------( 291      ( 03 Oct 2024 05:30 )             44.0     10-03    142  |  107  |  16  ----------------------------<  86  4.3   |  26  |  0.85    Ca    9.5      03 Oct 2024 05:30

## 2024-10-03 NOTE — PROGRESS NOTE ADULT - ASSESSMENT
29-year-old right-handed Female with PMHx of seizures presents for assessment of level of seizure and possible medication adjustment while undergoing vEEG monitoring. Patient reports multiple episodes of feeling lion vu and heart racing (auras) in 2021, that she originally thought were panic attacks and in January 2022 had her first GTC seizure. In April 2022 she was diagnosed with Epilepsy. Her most recent seizure was in July 2023 and since then she had multiple auras in 2024 despite being on Keppra 1500mg BID and Lamotrigine 400mg/300mg. vEEG placed on 09/29/2024 shows epileptiform discharges over the left fronto-temporal region thus far. No seizure despite holding AEDs and trigger induction methods. No reported auras w/ reduction of Keppra. will continue to taper off keppra until d/c. Some episode of dizziness yesterday could be related to LTG or could also be due to bedrest. Will break up total dose of LTG over the course of the day.

## 2024-10-03 NOTE — DIETITIAN INITIAL EVALUATION ADULT - PERTINENT MEDS FT
MEDICATIONS  (STANDING):  folic acid 1 milliGRAM(s) Oral <User Schedule>  influenza   Vaccine 0.5 milliLiter(s) IntraMuscular once  lamoTRIgine 300 milliGRAM(s) Oral <User Schedule>  levETIRAcetam 750 milliGRAM(s) Oral <User Schedule>    MEDICATIONS  (PRN):  acetaminophen     Tablet .. 650 milliGRAM(s) Oral every 6 hours PRN Temp greater or equal to 38.5C (101.3F), Mild Pain (1 - 3)  LORazepam   Injectable 2 milliGRAM(s) IV Push every 2 hours PRN Seizure Activity

## 2024-10-03 NOTE — PROGRESS NOTE ADULT - ASSESSMENT
Ms. 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     Recommendations:    #Left choroidal fissure cyst  #Epilepsy  - vEEG ongoing   - Neuro-Psych eval done  - seizure precautions  - management per epilepsy team    #Dizziness upon standing  - improved  - can get orthostatic vital signs  - encouraged to increase oral fluid intake  - fall precautions    #Tobacco and cannabis use  - smoking cessation counseling  - SW referral    Feel free to reach out for any questions. Recommendations discussed with primary team.

## 2024-10-03 NOTE — DIETITIAN INITIAL EVALUATION ADULT - REASON INDICATOR FOR ASSESSMENT
[facility] INITIAL OB VISIT      Flora is a 24 year old       here for initial obstetrical visit and physical.  She had a previous ectopic pregnancy in 2022.  She all laparoscopic right salpingectomy for management.  She and her partner are very excited to be pregnant again.  She has had some nausea which has improved since 7 weeks.  She had a little bit of vaginal bleeding for which she went to the emergency room at the end of January.  She had ultrasound performed which showed a small 1 cm perigestational hemorrhage.  She states she had several days following that visit of brown spotting but this has also resolved.    Last Menstrual Period 22    Prior pregnancies   Right ectopic pregnancy 10/22.  Had a right salpingectomy    I have reviewed the patient's medications and allergies, past medical, surgical, social, obstetrical and family history, updating these as appropriate.  See Histories section of the electronic medical record for a display of this information.    Prenatal labs were previously drawn and noted to be normla.  Pap smear up to date.  Sexually transmitted disease testing negative 22.  The patient is taking a prenatal vitamin.  Weight gain recommendations were discussed based on starting body mass index, and based on [employer] guidelines, recommended weight gain for this pregnancy is 25-35 lbs.  Genetic screening was discussed and accepted.  Cystic fibrosis and extended carrier screening was discussed. Genetic counseling appointment is offered and already done.  Vaccines discussed. She was informed that we will be recommending TDaP in the third trimester and the flu vaccine.  Received flu vaccine.  Declines Covid vaccine.    We discussed the COVID vaccine. Reviewed recommendation from ACOG and [facility] that pregnant patients receive the vaccine. Reviewed current safety and effectiveness data for both mother and fetus, in addition to concern for increased  risk of severe infection requiring ventilation and death in pregnant women infected with COVID. Reviewed adverse pregnancy outcomes of COVID19 infection including stillbirth,  delivery, preeclampsia, and  NICU admission.   Breastfeeding discussed and encouraged.    See prenatal physical.  Fetal heart tones 150.    A/P:  1.  IUP at 12 47 weeks  -Routine prenatal care discussed  -Follow up in one month   length of stay nutrition assessment

## 2024-10-03 NOTE — DIETITIAN INITIAL EVALUATION ADULT - PROBLEM SELECTOR PLAN 2
Nutrition & Prophylaxis:    E:  K>4, Mg>2, Phos >3  N: Regular diet  DVT PPx: SCDs  GI PPx: None  Dispo: 7WOLL  Code: FULL CODE

## 2024-10-04 PROCEDURE — 95720 EEG PHY/QHP EA INCR W/VEEG: CPT

## 2024-10-04 PROCEDURE — 99232 SBSQ HOSP IP/OBS MODERATE 35: CPT

## 2024-10-04 PROCEDURE — 99233 SBSQ HOSP IP/OBS HIGH 50: CPT

## 2024-10-04 RX ORDER — LEVETIRACETAM 1000 MG
250 TABLET ORAL ONCE
Refills: 0 | Status: COMPLETED | OUTPATIENT
Start: 2024-10-04 | End: 2024-10-04

## 2024-10-04 RX ORDER — LAMOTRIGINE 25 MG/1
200 TABLET ORAL
Refills: 0 | Status: DISCONTINUED | OUTPATIENT
Start: 2024-10-04 | End: 2024-10-07

## 2024-10-04 RX ADMIN — Medication 250 MILLIGRAM(S): at 13:54

## 2024-10-04 RX ADMIN — LAMOTRIGINE 200 MILLIGRAM(S): 25 TABLET ORAL at 21:10

## 2024-10-04 RX ADMIN — LAMOTRIGINE 200 MILLIGRAM(S): 25 TABLET ORAL at 15:22

## 2024-10-04 RX ADMIN — Medication 250 MILLIGRAM(S): at 21:10

## 2024-10-04 RX ADMIN — FOLIC ACID 1 MILLIGRAM(S): 1 TABLET ORAL at 09:01

## 2024-10-04 RX ADMIN — LAMOTRIGINE 150 MILLIGRAM(S): 25 TABLET ORAL at 09:02

## 2024-10-04 NOTE — PROGRESS NOTE ADULT - PROBLEM SELECTOR PLAN 1
Continue VEEG monitoring   - STAT Levetiracetam 250mg po x 1, if feeling does not improve will give another dose tonight.   - c/w Lamotrigine 200mg po TID (0900 - 1500 - 2100)  - Ativan 2mg IVP PRN for seizures > 2mins  - Vitals & Neurological assessment Q8hrs  - Maintain Seizure/Fall/Aspiration precautions

## 2024-10-04 NOTE — PROGRESS NOTE ADULT - SUBJECTIVE AND OBJECTIVE BOX
Patient is a 29y old  Female who presents with a chief complaint of seizure.    Patient seen and examined today. She feels well and has no new complaints. She denies headache, numbness, fever, focal weakness, breakthrough seizures.    Allergies    No Known Allergies    Last Bowel Movement: 29-Sep-2024 (10-01-24 @ 12:23)    MEDICATIONS  (STANDING):  folic acid 1 milliGRAM(s) Oral <User Schedule>  influenza   Vaccine 0.5 milliLiter(s) IntraMuscular once  lamoTRIgine 150 milliGRAM(s) Oral <User Schedule>  levETIRAcetam 250 milliGRAM(s) Oral once  levETIRAcetam 250 milliGRAM(s) Oral once    MEDICATIONS  (PRN):  acetaminophen     Tablet .. 650 milliGRAM(s) Oral every 6 hours PRN Temp greater or equal to 38.5C (101.3F), Mild Pain (1 - 3)  LORazepam   Injectable 2 milliGRAM(s) IV Push every 2 hours PRN Seizure Activity      Vital Signs Last 24 Hrs  T(C): 36.9 (10-04-24 @ 12:28), Max: 36.9 (10-04-24 @ 06:45)  T(F): 98.4 (10-04-24 @ 12:28), Max: 98.5 (10-04-24 @ 06:45)  HR: 75 (10-04-24 @ 12:28) (62 - 77)  BP: 108/67 (10-04-24 @ 12:28) (95/58 - 108/67)  BP(mean): --  RR: 16 (10-04-24 @ 12:28) (16 - 17)  SpO2: 95% (10-04-24 @ 12:28) (95% - 98%)      I&O's Summary    Oxygen Saturation Index= Unable to calculate   [Based on FiO2 = Unknown, SpO2 = 95(10/04/2024 12:28), MAP = Unknown]    PHYSICAL EXAM:  GENERAL: NAD  HEAD:  vEEG leads on   EYES: EOMI, conjunctiva and sclera clear  ENMT: Moist mucous membranes  NECK: Supple, No JVD  NERVOUS SYSTEM:  Alert & Oriented X3, Moves all extremities  CHEST/LUNG: Clear to auscultation bilaterally  HEART: Regular rate and rhythm; Normal S1 and S2  ABDOMEN: Soft, Nontender, Bowel sounds present  EXTREMITIES: No clubbing, cyanosis, or edema  PSYCH: Normal mood and affect    Consultant(s) Notes Reviewed:  [x ] YES  [ ] NO  Care Discussed with Consultants/Other Providers [ x] YES  [ ] NO    Investigations:                        14.1   7.82  )-----------( 291      ( 03 Oct 2024 05:30 )             44.0     10-03    142  |  107  |  16  ----------------------------<  86  4.3   |  26  |  0.85    Ca    9.5      03 Oct 2024 05:30

## 2024-10-04 NOTE — PROGRESS NOTE ADULT - ASSESSMENT
29-year-old right-handed Female with PMHx of seizures presents for assessment of level of seizure and possible medication adjustment while undergoing vEEG monitoring. Patient reports multiple episodes of feeling lion vu and heart racing (auras) in 2021, that she originally thought were panic attacks and in January 2022 had her first GTC seizure. In April 2022 she was diagnosed with Epilepsy. Her most recent seizure was in July 2023 and since then she had multiple auras in 2024 despite being on Keppra 1500mg BID and Lamotrigine 400mg/300mg. vEEG placed on 09/29/2024 shows epileptiform discharges over the left fronto-temporal region thus far. No seizure despite holding AEDs and trigger induction methods. No reported auras w/ reduction of Keppra. will continue to taper off keppra until d/c. Some report of mood instability since yesterday could be as a result of the rapid wean of Keppra.

## 2024-10-04 NOTE — PROGRESS NOTE ADULT - ASSESSMENT
Ms. 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     Recommendations:    #Left choroidal fissure cyst  #Epilepsy  - vEEG ongoing; EEG report from yesterday "There were epileptiform discharges over the left fronto-temporal region perhaps slightly more obvious especially during sleep, maximal at F7"  - Neuro-Psych eval done  - seizure precautions  - management per epilepsy team    #Dizziness upon standing, now resolved   - encouraged to increase oral fluid intake  - fall precautions  - check orthostatic vitals if it recurs    #Tobacco and cannabis use  - smoking cessation counseling  - SW referral    Feel free to reach out for any questions. Reached out to primary team to discuss recommendations.

## 2024-10-04 NOTE — PROGRESS NOTE ADULT - SUBJECTIVE AND OBJECTIVE BOX
Interval History:    No seizure or Aura overnight, however, she has been feeling a little emotional since yesterday. She is unsure why but found her self crying watching old sitcoms.     HPI:  29-year-old right-handed Female with PMHx of seizures presents for a pre-surgical evaluation while undergoing vEEG monitoring. Patient reports multiple episodes of feeling lion vu and heart racing (auras) in 2021, after her Grandmother passed that she originally thought were panic attacks. Then she had her first GTC seizure  in January 2022 and another in April 2022 (diagnosed with Epilepsy at this time) after brother passed in March 2022. Her most recent seizure was in July 2023 and since then she had multiple auras in 2024 despite being on Keppra 1500mg BID and Lamotrigine 400mg/300mg however for the past 4 days the Pharmacy was unable to fulfill Lamotrigine 100mg tabs so she did not take them in the morning. She is unaware of the duration of her seizures and reports confusion immediately following and usually comes to when the ambulance/EMS arrives. Patient reports seizures with fall and convulsion but denies tongue biting, foaming of the mouth or having urinary or fecal incontinence. 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 No electrographic seizures recorded." Patient reports Family Hx of seizures in her Maternal Uncle and her Father has a h/o convulsing and passing out likely from panic attack. Her triggers include extreme emotions, stress, andinconsistent sleep schedule. The patient has no known h/o febrile seizures or CNS infection. Denies any recent illness, travel, HA, dizziness, changes in vision, changes in bowel/bladder function, n/v.    (30 Sep 2024 15:19)      PAST MEDICAL & SURGICAL HISTORY:  Epilepsy      Medications:  acetaminophen     Tablet .. 650 milliGRAM(s) Oral every 6 hours PRN  folic acid 1 milliGRAM(s) Oral <User Schedule>  influenza   Vaccine 0.5 milliLiter(s) IntraMuscular once  lamoTRIgine 200 milliGRAM(s) Oral <User Schedule>  levETIRAcetam 250 milliGRAM(s) Oral once  levETIRAcetam 250 milliGRAM(s) Oral once  LORazepam   Injectable 2 milliGRAM(s) IV Push every 2 hours PRN      Vital Signs Last 24 Hrs  T(C): 36.9 (04 Oct 2024 12:28), Max: 36.9 (04 Oct 2024 06:45)  T(F): 98.4 (04 Oct 2024 12:28), Max: 98.5 (04 Oct 2024 06:45)  HR: 75 (04 Oct 2024 12:28) (62 - 77)  BP: 108/67 (04 Oct 2024 12:28) (95/58 - 108/67)  BP(mean): --  RR: 16 (04 Oct 2024 12:28) (16 - 17)  SpO2: 95% (04 Oct 2024 12:28) (95% - 98%)    Parameters below as of 04 Oct 2024 12:28  Patient On (Oxygen Delivery Method): room air        Neurological Exam:   Mental status: Awake, alert and oriented x3.  Recent and remote memory intact.  Naming, repetition and comprehension intact.  No dysarthria, no aphasia.  Fund of knowledge appropriate.    Cranial nerves: Pupils equally round and reactive to light, visual fields full, no nystagmus, extraocular muscles intact, V1 through V3 intact bilaterally and symmetric, face symmetric, hearing intact to finger rub, palate elevation symmetric, tongue was midline.  Motor:  MRC grading 5/5 b/l UE/LE.   Normal tone and bulk.  No abnormal movements.    Sensation: Intact to light touch in all extremities  Coordination: No dysmetria on finger-to-nose and heel-to-shin  Reflexes: 2+ in bilateral UE/LE, downgoing toes bilaterally. (-) Griffin. clonus absent   Gait: deferred       Levetiracetam Level, Serum: 26.6: Performed At:  [facility]  [address], [place] [id number]  Choco Boateng MD Ph:[phone] ug/mL (09.30.24 @ 16:00)    Lamotrigine Level, Serum: 9.2: Detection Limit = 1.0  Performed At:  [facility]  [address], [place] [id number]  Choco Boateng MD Ph:[phone] ug/mL (09.30.24 @ 16:00)                   Interval History:    No seizure or Aura overnight, however, she has been feeling a little emotional since yesterday. She is unsure why but found her self crying watching old sitcoms.     HPI:  29-year-old right-handed Female with PMHx of seizures presents for a pre-surgical evaluation while undergoing vEEG monitoring. Patient reports multiple episodes of feeling lion vu and heart racing (auras) in 2021, after her Grandmother passed that she originally thought were panic attacks. Then she had her first GTC seizure  in January 2022 and another in April 2022 (diagnosed with Epilepsy at this time) after brother passed in March 2022. Her most recent seizure was in July 2023 and since then she had multiple auras in 2024 despite being on Keppra 1500mg BID and Lamotrigine 400mg/300mg however for the past 4 days the Pharmacy was unable to fulfill Lamotrigine 100mg tabs so she did not take them in the morning. She is unaware of the duration of her seizures and reports confusion immediately following and usually comes to when the ambulance/EMS arrives. Patient reports seizures with fall and convulsion but denies tongue biting, foaming of the mouth or having urinary or fecal incontinence. 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 No electrographic seizures recorded." Patient reports Family Hx of seizures in her Maternal Uncle and her Father has a h/o convulsing and passing out likely from panic attack. Her triggers include extreme emotions, stress, and inconsistent sleep schedule. The patient has no known h/o febrile seizures or CNS infection. Denies any recent illness, travel, HA, dizziness, changes in vision, changes in bowel/bladder function, n/v.    (30 Sep 2024 15:19)      PAST MEDICAL & SURGICAL HISTORY:  Epilepsy      Medications:  acetaminophen     Tablet .. 650 milliGRAM(s) Oral every 6 hours PRN  folic acid 1 milliGRAM(s) Oral <User Schedule>  influenza   Vaccine 0.5 milliLiter(s) IntraMuscular once  lamoTRIgine 200 milliGRAM(s) Oral <User Schedule>  levETIRAcetam 250 milliGRAM(s) Oral once  levETIRAcetam 250 milliGRAM(s) Oral once  LORazepam   Injectable 2 milliGRAM(s) IV Push every 2 hours PRN      Vital Signs Last 24 Hrs  T(C): 36.9 (04 Oct 2024 12:28), Max: 36.9 (04 Oct 2024 06:45)  T(F): 98.4 (04 Oct 2024 12:28), Max: 98.5 (04 Oct 2024 06:45)  HR: 75 (04 Oct 2024 12:28) (62 - 77)  BP: 108/67 (04 Oct 2024 12:28) (95/58 - 108/67)  BP(mean): --  RR: 16 (04 Oct 2024 12:28) (16 - 17)  SpO2: 95% (04 Oct 2024 12:28) (95% - 98%)    Parameters below as of 04 Oct 2024 12:28  Patient On (Oxygen Delivery Method): room air        Neurological Exam:   Mental status: Awake, alert and oriented x3.  Recent and remote memory intact.  Naming, repetition and comprehension intact.  No dysarthria, no aphasia.  Fund of knowledge appropriate.    Cranial nerves: Pupils equally round and reactive to light, visual fields full, no nystagmus, extraocular muscles intact, V1 through V3 intact bilaterally and symmetric, face symmetric, hearing intact to finger rub, palate elevation symmetric, tongue was midline.  Motor:  MRC grading 5/5 b/l UE/LE.   Normal tone and bulk.  No abnormal movements.    Sensation: Intact to light touch in all extremities  Coordination: No dysmetria on finger-to-nose and heel-to-shin  Reflexes: 2+ in bilateral UE/LE, downgoing toes bilaterally. (-) Griffin. clonus absent   Gait: deferred       Levetiracetam Level, Serum: 26.6: Performed At: [facility]  [address], [place] [id number]  Choco Boateng MD Ph:[phone] ug/mL (09.30.24 @ 16:00)    Lamotrigine Level, Serum: 9.2: Detection Limit = 1.0  Performed At: [facility]  [address], [place] [id number]  Choco Boateng MD Ph:[phone] ug/mL (09.30.24 @ 16:00)

## 2024-10-04 NOTE — EEG REPORT - NS EEG TEXT BOX
[facility] Department of Neurology  Inpatient Epilepsy Monitoring Unit video-Electroencephalography Report    Acquisition Details:  Electroencephalography was acquired using a minimum of 21 channels on an XLMoneyExpert Neurology system v 9.3.1 with electrode placement according to the standard International 10-20 system following ACNS (American Clinical Neurophysiology Society) guidelines for Long-Term Video EEG monitoring.  Anterior temporal T1 and T2 electrodes were utilized whenever possible.   The XLTEK automated spike & seizure detections were all reviewed in detail, in addition to extensive portions of raw EEG.  Specially-trained nurses were available for seizure-related events.  Continuous live-time video monitoring of the patients for seizure-related and safety events was performed by specially-trained technicians.        Daily Updates (from 07:00 am until 07:00 am):  Day 3  Oct 3-4, 2024:   Medications:  LEV ->750mg/250, LTG remained 300mg bid  Background:  continuous, with predominantly alpha and beta frequencies.  Voltage:  Normal (20+ uV)  Organization:  Appropriate anterior-posterior gradient  Posterior Dominant Rhythm:  10 Hz symmetric, well-organized, and well-modulated  Sleep:  Symmetric, synchronous spindles and K complexes.  Focal abnormalities:  No persistent asymmetries of voltage or frequency.  Spontaneous Activity:  Occasional ( >1/hr < 1/min)  Left frontotemporal (F7)  Epileptiform sharp waves ( msec) during sleep.   Events:  No definite clinical or electrographic events.  Provocations:  •	Hyperventilation: was not performed.  •	Photic stimulation: did not evoke EEG abnormalities.  Daily Summary:    •	There were epileptiform discharges over the left fronto-temporal region perhaps slightly more obvious especially during sleep, maximal at F7.  •	None of the patient's typical events were recorded.    Mariano Ziegler MD  Attending Neurologist, [facility] Epilepsy Program

## 2024-10-05 PROCEDURE — 99232 SBSQ HOSP IP/OBS MODERATE 35: CPT

## 2024-10-05 PROCEDURE — 95720 EEG PHY/QHP EA INCR W/VEEG: CPT

## 2024-10-05 RX ORDER — LEVETIRACETAM 1000 MG
250 TABLET ORAL ONCE
Refills: 0 | Status: COMPLETED | OUTPATIENT
Start: 2024-10-05 | End: 2024-10-05

## 2024-10-05 RX ADMIN — LAMOTRIGINE 200 MILLIGRAM(S): 25 TABLET ORAL at 15:04

## 2024-10-05 RX ADMIN — LAMOTRIGINE 200 MILLIGRAM(S): 25 TABLET ORAL at 21:27

## 2024-10-05 RX ADMIN — LAMOTRIGINE 200 MILLIGRAM(S): 25 TABLET ORAL at 09:50

## 2024-10-05 RX ADMIN — FOLIC ACID 1 MILLIGRAM(S): 1 TABLET ORAL at 09:51

## 2024-10-05 RX ADMIN — Medication 250 MILLIGRAM(S): at 13:13

## 2024-10-05 NOTE — PROGRESS NOTE ADULT - SUBJECTIVE AND OBJECTIVE BOX
JOLEEN RIVERA 29y Female    INTERVAL EVENTS:   no acute events during interval/overnight.    SUBJECTIVE:  Patient seen and examined at bedside.    Rest of 12 point review of systems is negative except as stated above.    OBJECTIVE:  Vital Signs Last 24 Hrs  T(C): 36.9 (05 Oct 2024 05:28), Max: 36.9 (04 Oct 2024 12:28)  T(F): 98.4 (05 Oct 2024 05:28), Max: 98.4 (04 Oct 2024 12:28)  HR: 57 (05 Oct 2024 05:28) (57 - 75)  BP: 102/60 (05 Oct 2024 05:28) (102/60 - 108/67)  BP(mean): 72 (05 Oct 2024 05:28) (72 - 72)  RR: 18 (05 Oct 2024 05:28) (16 - 18)  SpO2: 97% (05 Oct 2024 05:28) (95% - 97%)    Parameters below as of 05 Oct 2024 05:28  Patient On (Oxygen Delivery Method): room air        PHYSICAL EXAM:  General: NAD. Speaking in full sentences. Resting in bed.   HEENT: NC/AT; PERRL, clear conjunctiva  Neck: supple  Respiratory: CTA b/l, no retractions or accessory muscle use. No increased work of breathing.   Cardiovascular: +S1/S2; RRR  Abdomen: soft, NT/ND; +BS x4  Extremities: 2+ peripheral pulses b/l; no LE edema  Skin: normal color and turgor; no rash; warm and well perfused  Neurological: AAOx3. No FND noted.  Psychiatry: Mood and Affect appropriate.    LABS:              CAPILLARY BLOOD GLUCOSE            MICRODATA:      RADIOLOGY/OTHER STUDIES: reviewed.      MEDICATIONS:  acetaminophen     Tablet .. 650 milliGRAM(s) Oral every 6 hours PRN  folic acid 1 milliGRAM(s) Oral <User Schedule>  influenza   Vaccine 0.5 milliLiter(s) IntraMuscular once  lamoTRIgine 200 milliGRAM(s) Oral <User Schedule>  LORazepam   Injectable 2 milliGRAM(s) IV Push every 2 hours PRN    No Known Allergies   JOLEEN RIVERA 29y Female    INTERVAL EVENTS:   no acute events during interval/overnight.  eeg report;  •	There were epileptiform discharges over the left fronto-temporal region perhaps slightly more obvious especially during drowsiness and sleep, maximal at F7.  •	None of the patient's typical events were recorded.    SUBJECTIVE:  Patient seen and examined at bedside.  She feels well overall, no complaints. Looking forward to going home when possible. No abdo pain, cough, sob, chills. Denies any precipitating factors prior to seizure, no recent fevers, illness, trauma. No known family h/o seizure disorder, however she says she doesn't konw her family history well.   She reports her auras are "intense lion vu" and are brought on by stress/emotion more frequently, as well as missing her antiepileptic medications.   Rest of 12 point review of systems is negative except as stated above.    OBJECTIVE:  Vital Signs Last 24 Hrs  T(C): 36.9 (05 Oct 2024 05:28), Max: 36.9 (04 Oct 2024 12:28)  T(F): 98.4 (05 Oct 2024 05:28), Max: 98.4 (04 Oct 2024 12:28)  HR: 57 (05 Oct 2024 05:28) (57 - 75)  BP: 102/60 (05 Oct 2024 05:28) (102/60 - 108/67)  BP(mean): 72 (05 Oct 2024 05:28) (72 - 72)  RR: 18 (05 Oct 2024 05:28) (16 - 18)  SpO2: 97% (05 Oct 2024 05:28) (95% - 97%)    Parameters below as of 05 Oct 2024 05:28  Patient On (Oxygen Delivery Method): room air        PHYSICAL EXAM:  General: NAD. Speaking in full sentences. Resting in bed.   HEENT: NC/AT; PERRL, clear conjunctival; EEG in place  Neck: supple  Respiratory: CTA b/l, no retractions or accessory muscle use. No increased work of breathing.   Cardiovascular: +S1/S2; RRR  Abdomen: soft, NT/ND; +BS x4  Extremities: 2+ peripheral pulses b/l; no LE edema; right thumb wrapped in bandage after  accident resulting in laceration/stitches. No warmth, redness, edema surrounding bandage.   Skin: normal color and turgor; no rash; warm and well perfused  Neurological: AAOx3. No FND noted.  Psychiatry: Mood and Affect appropriate.      MEDICATIONS:  acetaminophen     Tablet .. 650 milliGRAM(s) Oral every 6 hours PRN  folic acid 1 milliGRAM(s) Oral <User Schedule>  influenza   Vaccine 0.5 milliLiter(s) IntraMuscular once  lamoTRIgine 200 milliGRAM(s) Oral <User Schedule>  LORazepam   Injectable 2 milliGRAM(s) IV Push every 2 hours PRN    No Known Allergies

## 2024-10-05 NOTE — PROGRESS NOTE ADULT - ASSESSMENT
29-year-old right-handed Female with PMHx of seizures presents for assessment of level of seizure and possible medication adjustment while undergoing vEEG monitoring. Patient reports multiple episodes of feeling lion vu and heart racing (auras) in 2021, that she originally thought were panic attacks and in January 2022 had her first GTC seizure. In April 2022 she was diagnosed with Epilepsy. Her most recent seizure was in July 2023 and since then she had multiple auras in 2024 despite being on Keppra 1500mg BID and Lamotrigine 400mg/300mg. vEEG placed on 09/29/2024 shows epileptiform discharges over the left fronto-temporal region thus far. No seizure despite holding AEDs and trigger induction methods. No reported auras w/ reduction of Keppra. will continue to taper off keppra until d/c. Some report of mood instability since yesterday could be as a result of the rapid wean of Keppra.  29-year-old right-handed Female with PMHx of seizures presents for assessment of level of seizure and possible medication adjustment while undergoing vEEG monitoring. Patient reports multiple episodes of feeling lion vu and heart racing (auras) in 2021, that she originally thought were panic attacks and in January 2022 had her first GTC seizure. In April 2022 she was diagnosed with Epilepsy. Her most recent seizure was in July 2023 and since then she had multiple auras in 2024 despite being on Keppra 1500mg BID and Lamotrigine 400mg/400mg. vEEG placed on 09/29/2024 shows epileptiform discharges over the left fronto-temporal region thus far. No seizure despite holding AEDs and trigger induction methods. No reported auras w/ reduction of Keppra. Will continue to taper off keppra until d/c. Some report of mood instability since yesterday could be as a result of the rapid wean of Keppra but now improved. 29-year-old right-handed Female with PMHx of seizures presents for assessment of level of seizure and possible medication adjustment while undergoing vEEG monitoring. Patient reports multiple episodes of feeling lion vu and heart racing (auras) in 2021, that she originally thought were panic attacks and in January 2022 had her first GTC seizure. In April 2022 she was diagnosed with Epilepsy. Her most recent seizure was in July 2023 and since then she had multiple auras in 2024 despite being on Keppra 1500mg BID and Lamotrigine 400mg/300mg. vEEG placed on 09/29/2024 shows epileptiform discharges over the left fronto-temporal region thus far. No seizure despite holding AEDs and trigger induction methods. No reported auras w/ reduction of Keppra. will continue to taper off keppra until d/c. Some report of mood instability since yesterday could be as a result of the rapid wean of Keppra.       Got 2 doses of 250mg yesterday due to mood instability (10/4), will give 1 dose 250mg keppra today ( 10/5) in addition to usual LTG to avoid GTC. Mood is improved.

## 2024-10-05 NOTE — PROGRESS NOTE ADULT - SUBJECTIVE AND OBJECTIVE BOX
Neurology Progress Note    Interval History:    Patient seen and examined today. No events overnight.     HPI:  29-year-old right-handed Female with PMHx of seizures presents for a pre-surgical evaluation while undergoing vEEG monitoring. Patient reports multiple episodes of feeling lion vu and heart racing (auras) in 2021, after her Grandmother passed that she originally thought were panic attacks. Then she had her first GTC seizure  in January 2022 and another in April 2022 (diagnosed with Epilepsy at this time) after brother passed in March 2022. Her most recent seizure was in July 2023 and since then she had multiple auras in 2024 despite being on Keppra 1500mg BID and Lamotrigine 400mg/300mg however for the past 4 days the Pharmacy was unable to fulfill Lamotrigine 100mg tabs so she did not take them in the morning. She is unaware of the duration of her seizures and reports confusion immediately following and usually comes to when the ambulance/EMS arrives. Patient reports seizures with fall and convulsion but denies tongue biting, foaming of the mouth or having urinary or fecal incontinence. 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 No electrographic seizures recorded." Patient reports Family Hx of seizures in her Maternal Uncle and her Father has a h/o convulsing and passing out likely from panic attack. Her triggers include extreme emotions, stress, andinconsistent sleep schedule. The patient has no known h/o febrile seizures or CNS infection. Denies any recent illness, travel, HA, dizziness, changes in vision, changes in bowel/bladder function, n/v.    (30 Sep 2024 15:19)      PAST MEDICAL & SURGICAL HISTORY:  Epilepsy            Medications:  acetaminophen     Tablet .. 650 milliGRAM(s) Oral every 6 hours PRN  folic acid 1 milliGRAM(s) Oral <User Schedule>  influenza   Vaccine 0.5 milliLiter(s) IntraMuscular once  lamoTRIgine 200 milliGRAM(s) Oral <User Schedule>  LORazepam   Injectable 2 milliGRAM(s) IV Push every 2 hours PRN      Vital Signs Last 24 Hrs  T(C): 36.9 (05 Oct 2024 05:28), Max: 36.9 (04 Oct 2024 12:28)  T(F): 98.4 (05 Oct 2024 05:28), Max: 98.4 (04 Oct 2024 12:28)  HR: 57 (05 Oct 2024 05:28) (57 - 75)  BP: 102/60 (05 Oct 2024 05:28) (102/60 - 108/67)  BP(mean): 72 (05 Oct 2024 05:28) (72 - 72)  RR: 18 (05 Oct 2024 05:28) (16 - 18)  SpO2: 97% (05 Oct 2024 05:28) (95% - 97%)    Parameters below as of 05 Oct 2024 05:28  Patient On (Oxygen Delivery Method): room air        Neurological Exam:   Mental status: Awake, alert and oriented x3.  Recent and remote memory intact.  Naming, repetition and comprehension intact.  No dysarthria, no aphasia.  Fund of knowledge appropriate.    Cranial nerves: Pupils equally round and reactive to light, visual fields full, no nystagmus, extraocular muscles intact, V1 through V3 intact bilaterally and symmetric, face symmetric, hearing intact to finger rub, palate elevation symmetric, tongue was midline.  Motor:  MRC grading 5/5 b/l UE/LE.   Normal tone and bulk.  No abnormal movements.    Sensation: Intact to light touch in all extremities  Coordination: No dysmetria on finger-to-nose and heel-to-shin  Reflexes: 2+ in bilateral UE/LE, downgoing toes bilaterally. (-) Griffin. clonus absent   Gait: deferred       Levetiracetam Level, Serum: 26.6: Performed At:  [facility] [id number]  Choco Boateng MD Ph:[phone] ug/mL (09.30.24 @ 16:00)    Lamotrigine Level, Serum: 9.2: Detection Limit = 1.0  Performed At:  [facility] [id number]  Choco Boateng MD Ph:[phone] ug/mL (09.30.24 @ 16:00)                    Radiology: Reviewed Neurology Progress Note    Interval History:    Patient seen and examined today. No events overnight. No seizure events so far while in the hospital despite triggers. She was given keppra 250mg BID yesterday. Pt is feeling better this morning compare to yesterday. She denied any aura last night. No new complaints this morning, and was asking how long she need to stay.      PAST MEDICAL & SURGICAL HISTORY:  Epilepsy            Medications:  acetaminophen     Tablet .. 650 milliGRAM(s) Oral every 6 hours PRN  folic acid 1 milliGRAM(s) Oral <User Schedule>  influenza   Vaccine 0.5 milliLiter(s) IntraMuscular once  lamoTRIgine 200 milliGRAM(s) Oral <User Schedule>  LORazepam   Injectable 2 milliGRAM(s) IV Push every 2 hours PRN      Vital Signs Last 24 Hrs  T(C): 36.9 (05 Oct 2024 05:28), Max: 36.9 (04 Oct 2024 12:28)  T(F): 98.4 (05 Oct 2024 05:28), Max: 98.4 (04 Oct 2024 12:28)  HR: 57 (05 Oct 2024 05:28) (57 - 75)  BP: 102/60 (05 Oct 2024 05:28) (102/60 - 108/67)  BP(mean): 72 (05 Oct 2024 05:28) (72 - 72)  RR: 18 (05 Oct 2024 05:28) (16 - 18)  SpO2: 97% (05 Oct 2024 05:28) (95% - 97%)    Parameters below as of 05 Oct 2024 05:28  Patient On (Oxygen Delivery Method): room air        Neurological Exam:   Mental status: Awake, alert and oriented x3.  Recent and remote memory intact.  Naming, repetition and comprehension intact.  No dysarthria, no aphasia.  Fund of knowledge appropriate.    Cranial nerves: Pupils equally round and reactive to light, visual fields full, no nystagmus, extraocular muscles intact, V1 through V3 intact bilaterally and symmetric, face symmetric, hearing intact to finger rub, palate elevation symmetric, tongue was midline.  Motor:  MRC grading 5/5 b/l UE/LE.   Normal tone and bulk.  No abnormal movements.    Sensation: Intact to light touch in all extremities  Coordination: No dysmetria on finger-to-nose and heel-to-shin  Reflexes: 2+ in bilateral UE/LE  Gait: deferred       Levetiracetam Level, Serum: 26.6: Performed At:  [facility]  [address] [place], [place] [id number]  Choco Boateng MD Ph:[phone] ug/mL (09.30.24 @ 16:00)    Lamotrigine Level, Serum: 9.2: Detection Limit = 1.0  Performed At: [facility] [place]  [address] [place], [place] [id number]  Choco Boateng MD Ph:[phone] ug/mL (09.30.24 @ 16:00)      Radiology: Reviewed    EEG:    Daily Updates (from 07:00 am until 07:00 am):  Day 4  Oct 4-5, 2024:   Medications:   AM, LTG 200mg Tid  Background:  continuous, with predominantly alpha and beta frequencies.  Voltage:  Normal (20+ uV)  Organization:  Appropriate anterior-posterior gradient  Posterior Dominant Rhythm:  10 Hz symmetric, well-organized, and well-modulated  Sleep:  Symmetric, synchronous spindles and K complexes.  Focal abnormalities:  No persistent asymmetries of voltage or frequency.  Spontaneous Activity:  Occasional ( >1/hr < 1/min)  Left frontotemporal (F7)  Epileptiform sharp waves ( msec) during drowsiness and sleep only.   Events:  No definite clinical or electrographic events.  Provocations:  •	Hyperventilation: was not performed.  •	Photic stimulation: did not evoke EEG abnormalities.    Daily Summary:    •	There were epileptiform discharges over the left fronto-temporal region perhaps slightly more obvious especially during drowsiness and sleep, maximal at F7.  •	None of the patient's typical events were recorded.   Neurology Progress Note    Interval History:    Patient seen and examined today. No events overnight. No seizure events so far while in the hospital despite triggers. She was given keppra 250mg BID yesterday. Pt is feeling better this morning compare to yesterday. She denied any aura last night. No new complaints this morning, and was asking how long she need to stay.      PAST MEDICAL & SURGICAL HISTORY:  Epilepsy      Medications:  acetaminophen     Tablet .. 650 milliGRAM(s) Oral every 6 hours PRN  folic acid 1 milliGRAM(s) Oral <User Schedule>  influenza   Vaccine 0.5 milliLiter(s) IntraMuscular once  lamoTRIgine 200 milliGRAM(s) Oral <User Schedule>  LORazepam   Injectable 2 milliGRAM(s) IV Push every 2 hours PRN      Vital Signs Last 24 Hrs  T(C): 36.9 (05 Oct 2024 05:28), Max: 36.9 (04 Oct 2024 12:28)  T(F): 98.4 (05 Oct 2024 05:28), Max: 98.4 (04 Oct 2024 12:28)  HR: 57 (05 Oct 2024 05:28) (57 - 75)  BP: 102/60 (05 Oct 2024 05:28) (102/60 - 108/67)  BP(mean): 72 (05 Oct 2024 05:28) (72 - 72)  RR: 18 (05 Oct 2024 05:28) (16 - 18)  SpO2: 97% (05 Oct 2024 05:28) (95% - 97%)    Parameters below as of 05 Oct 2024 05:28  Patient On (Oxygen Delivery Method): room air        Neurological Exam:   Mental status: Awake, alert and oriented x3.  Recent and remote memory intact.  Naming, repetition and comprehension intact.  No dysarthria, no aphasia.  Fund of knowledge appropriate.    Cranial nerves: Pupils equally round and reactive to light, visual fields full, no nystagmus, extraocular muscles intact, V1 through V3 intact bilaterally and symmetric, face symmetric, hearing intact to finger rub, palate elevation symmetric, tongue was midline.  Motor:  MRC grading 5/5 b/l UE/LE.   Normal tone and bulk.  No abnormal movements.    Sensation: Intact to light touch in all extremities  Coordination: No dysmetria on finger-to-nose and heel-to-shin  Reflexes: 2+ in bilateral UE/LE  Gait: deferred       Levetiracetam Level, Serum: 26.6: Performed At:  [facility]  [address] [place], [place] [id number]  Choco Boateng MD Ph:[phone] ug/mL (09.30.24 @ 16:00)    Lamotrigine Level, Serum: 9.2: Detection Limit = 1.0  Performed At: [facility]  [address] [place], [place] [id number]  Choco Boateng MD Ph:[phone] ug/mL (09.30.24 @ 16:00)      Radiology: Reviewed    EEG:    Daily Updates (from 07:00 am until 07:00 am):  Day 4  Oct 4-5, 2024:   Medications:   AM, LTG 200mg Tid  Background:  continuous, with predominantly alpha and beta frequencies.  Voltage:  Normal (20+ uV)  Organization:  Appropriate anterior-posterior gradient  Posterior Dominant Rhythm:  10 Hz symmetric, well-organized, and well-modulated  Sleep:  Symmetric, synchronous spindles and K complexes.  Focal abnormalities:  No persistent asymmetries of voltage or frequency.  Spontaneous Activity:  Occasional ( >1/hr < 1/min)  Left frontotemporal (F7)  Epileptiform sharp waves ( msec) during drowsiness and sleep only.   Events:  No definite clinical or electrographic events.  Provocations:  •	Hyperventilation: was not performed.  •	Photic stimulation: did not evoke EEG abnormalities.    Daily Summary:    •	There were epileptiform discharges over the left fronto-temporal region perhaps slightly more obvious especially during drowsiness and sleep, maximal at F7.  •	None of the patient's typical events were recorded.

## 2024-10-05 NOTE — PROGRESS NOTE ADULT - ASSESSMENT
**Incomplete Note**  **Incomplete Note**  **Incomplete Note**     Ms. 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     Recommendations:    #Left choroidal fissure cyst  #Epilepsy  - vEEG ongoing; EEG report from 10/3 "There were epileptiform discharges over the left fronto-temporal region perhaps slightly more obvious especially during sleep, maximal at F7"  - Neuro-Psych eval done  - seizure precautions  - management per epilepsy team    #Dizziness upon standing, now resolved   - encouraged to increase oral fluid intake  - fall precautions  - check orthostatic vitals if it recurs    #Tobacco and cannabis use  - smoking cessation counseling  - SW referral    Feel free to reach out for any questions. Reached out to primary team to discuss recommendations.          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     Recommendations:    #Left choroidal fissure cyst  #Epilepsy  - vEEG ongoing; EEG report from 10/5 today:  •	There were epileptiform discharges over the left fronto-temporal region perhaps slightly more obvious especially during drowsiness and sleep, maximal at F7.  •	None of the patient's typical events were recorded.  - Neuro-Psych eval done  - seizure precautions  - management per epilepsy team    #Dizziness upon standing, now resolved   - encouraged to increase oral fluid intake  - reports no dizziness today 10/5  - fall precautions  - check orthostatic vitals if it recurs    #Tobacco and cannabis use  - smoking cessation counseling  - SW referral    Feel free to reach out for any questions. Reached out to primary team to discuss recommendations.

## 2024-10-05 NOTE — EEG REPORT - NS EEG TEXT BOX
[facility] Department of Neurology  Epilepsy Monitoring Unit video-Electroencephalogram  [address] [place] [address], [address] [place] [id number], T: [phone]    Patient Name:	JOLEEN RIVERA    :	1995  MRN:	[id number]    Study Start Date/Time:	2024, 4:21:24 PM  Study End Date/Time:    Referred by:  Brenda Ward MD    Brief Clinical History:  JOLEEN RIVERA is a 29 year old Female with history of epilepsy, focal seizures (auras of déjà vu) and convulsions; study performed to investigate for seizures or markers of epilepsy.   Technologist notes: -  Diagnosis Code:  R56.9 convulsions/seizure    Patient Medication:  LTG 300mg bid    Acquisition Details:  Electroencephalography was acquired using a minimum of 21 channels on an [employer] system v 9.3.1 with electrode placement according to the standard International 10-20 system following ACNS (American Clinical Neurophysiology Society) guidelines.  Anterior temporal T1 and T2 electrodes were utilized whenever possible.  The [employer] automated spike & seizure detections were reviewed in detail, in addition to the entire raw EEG.  The live video was monitored continuously by trained technicians to identify events and specialty nurses trained in seizure management supervised the care of the patient in the epilepsy unit.      Daily Updates (from 07:00 am until 07:00 am):  Day 4  Oct 4-5, 2024:   Medications:   AM, LTG 200mg Tid  Background:  continuous, with predominantly alpha and beta frequencies.  Voltage:  Normal (20+ uV)  Organization:  Appropriate anterior-posterior gradient  Posterior Dominant Rhythm:  10 Hz symmetric, well-organized, and well-modulated  Sleep:  Symmetric, synchronous spindles and K complexes.  Focal abnormalities:  No persistent asymmetries of voltage or frequency.  Spontaneous Activity:  Occasional ( >1/hr < 1/min)  Left frontotemporal (F7)  Epileptiform sharp waves ( msec) during drowsiness and sleep only.   Events:  No definite clinical or electrographic events.  Provocations:  •	Hyperventilation: was not performed.  •	Photic stimulation: did not evoke EEG abnormalities.    Daily Summary:    •	There were epileptiform discharges over the left fronto-temporal region perhaps slightly more obvious especially during drowsiness and sleep, maximal at F7.  •	None of the patient's typical events were recorded.    Disha Askew DO  Attending Neurologist, [facility] Epile[facility]

## 2024-10-05 NOTE — PROGRESS NOTE ADULT - PROBLEM SELECTOR PLAN 1
Continue VEEG monitoring   - STAT Levetiracetam 250mg po x 1, if feeling does not improve will give another dose tonight.   - c/w Lamotrigine 200mg po TID (0900 - 1500 - 2100)  - Ativan 2mg IVP PRN for seizures > 2mins  - Vitals & Neurological assessment Q8hrs  - Maintain Seizure/Fall/Aspiration precautions Continue VEEG monitoring   - One dose of Levetiracetam 250mg po x 1 today  - c/w Lamotrigine 200mg po TID (0900 - 1500 - 2100)  - Ativan 2mg IVP PRN for seizures > 2mins  - Vitals & Neurological assessment Q8hrs  - Maintain Seizure/Fall/Aspiration precautions Continue VEEG monitoring   - STAT Levetiracetam 250mg po x 1 today, mood improved  - c/w Lamotrigine 200mg po TID (0900 - 1500 - 2100)  - Ativan 2mg IVP PRN for seizures > 2mins  - Vitals & Neurological assessment Q8hrs  - Maintain Seizure/Fall/Aspiration precautions

## 2024-10-06 DIAGNOSIS — I80.8 PHLEBITIS AND THROMBOPHLEBITIS OF OTHER SITES: ICD-10-CM

## 2024-10-06 PROCEDURE — 95720 EEG PHY/QHP EA INCR W/VEEG: CPT

## 2024-10-06 PROCEDURE — 99232 SBSQ HOSP IP/OBS MODERATE 35: CPT

## 2024-10-06 RX ADMIN — FOLIC ACID 1 MILLIGRAM(S): 1 TABLET ORAL at 09:50

## 2024-10-06 RX ADMIN — LAMOTRIGINE 200 MILLIGRAM(S): 25 TABLET ORAL at 09:50

## 2024-10-06 RX ADMIN — LAMOTRIGINE 200 MILLIGRAM(S): 25 TABLET ORAL at 22:05

## 2024-10-06 RX ADMIN — LAMOTRIGINE 200 MILLIGRAM(S): 25 TABLET ORAL at 15:26

## 2024-10-06 NOTE — PROGRESS NOTE ADULT - TIME BILLING
Bedside exam and interview.  Reviewed of laboratory data, radiology results, consultants' recommendations.   Discussion with patient/caregivers/housestaff and interdisciplinary staff (such as , social workers, etc).  Documentation of encounter.  Interventions were performed as documented above.  Excludes teaching time and/or separately reported services.
Bedside exam and interview   Reviewed vitals, labs   Discussed patient's plan of care with housestaff   Documentation of encounter
Bedside exam and interview   Reviewed vitals, labs   Discussed patient's plan of care with housestaff   Documentation of encounter
review of laboratory data, radiology results, consultants' recommendations, documentation in [employer], discussion with patient/caregivers/housestaff and interdisciplinary staff (such as , social workers, etc). Interventions were performed as documented above.
Bedside exam and interview   Reviewed vitals, labs   Discussed patient's plan of care with housestaff   Documentation of encounter

## 2024-10-06 NOTE — EEG REPORT - NS EEG TEXT BOX
[facility] Department of Neurology  Epilepsy Monitoring Unit video-Electroencephalogram  [address] [place] [address], [address] [place] [id number], T: [phone]    Patient Name:	JOLEEN RIVERA    :	1995  MRN:	[id number]    Study Start Date/Time:	2024, 4:21:24 PM  Study End Date/Time:    Referred by:  Brenda Ward MD    Brief Clinical History:  JOLEEN RIVERA is a 29 year old Female with history of epilepsy, focal seizures (auras of déjà vu) and convulsions; study performed to investigate for seizures or markers of epilepsy.   Technologist notes: -  Diagnosis Code:  R56.9 convulsions/seizure    Patient Medication:  LTG 300mg bid    Acquisition Details:  Electroencephalography was acquired using a minimum of 21 channels on an [employer] Neurology system v 9.3.1 with electrode placement according to the standard International 10-20 system following ACNS (American Clinical Neurophysiology Society) guidelines.  Anterior temporal T1 and T2 electrodes were utilized whenever possible.  The XLTEK automated spike & seizure detections were reviewed in detail, in addition to the entire raw EEG.  The live video was monitored continuously by trained technicians to identify events and specialty nurses trained in seizure management supervised the care of the patient in the epilepsy unit.    Findings:  Day 1 2024, 4:21:24 PM to next morning at 07:00 AM.  Background:  continuous, with predominantly alpha and beta frequencies.  Voltage:  Normal (20+ uV)  Organization:  Appropriate anterior-posterior gradient  Posterior Dominant Rhythm:  10 Hz symmetric, well-organized, and well-modulated  Sleep:  Symmetric, synchronous spindles and K complexes.  Focal abnormalities:  No persistent asymmetries of voltage or frequency.  Spontaneous Activity:  Occasional ( >1/hr < 1/min)  Left frontotemporal (F7)  Epileptiform sharp waves ( msec)   Events:  •	No electrographic seizures or significant clinical events occurred during this study.  Provocations:  •	Hyperventilation: did not evoke EEG abnormalities.  •	Photic stimulation: was not performed.  Daily Summary:    •	There were epileptiform discharges over the left fronto-temporal region, maximal at F7.  •	The patient's typical events were not recorded during the study.      Mariano Ziegler MD  Attending Neurologist, [facility] Epilepsy Program            Daily Updates (from 07:00 am until 07:00 am):  Day 2  Oct 1-2, 2024:   Background:  continuous, with predominantly alpha and beta frequencies.  Voltage:  Normal (20+ uV)  Organization:  Appropriate anterior-posterior gradient  Posterior Dominant Rhythm:  10 Hz symmetric, well-organized, and well-modulated  Sleep:  Symmetric, synchronous spindles and K complexes.  Focal abnormalities:  No persistent asymmetries of voltage or frequency.  Spontaneous Activity:  Occasional ( >1/hr < 1/min)  Left frontotemporal (F7)  Epileptiform sharp waves ( msec) during sleep.   Events:  1 push button activation without change in EEG background:  d1 18:24:18		pauses here  d1 18:24:23		Patient Event activated  another aura-like feeling around 02:30 AM, without EEG correlate.  Provocations:  •	Hyperventilation @ 3:59 PM: pt felt numb, but HV did not evoke EEG abnormalities.  •	Photic stimulation: was not performed.  Daily Summary:    •	There were epileptiform discharges over the left fronto-temporal region perhaps slightly more obvious especially during sleep, maximal at F7.  •	Small versions of the patient's typical events was recorded and was without an electrographic correlate.      Mariano Ziegler MD  Attending Neurologist, [facility] Epilepsy Program          Daily Updates (from 07:00 am until 07:00 am):  Day 3  Oct 2-3, 2024:   Medications:  LEV ->750mg bid, LTG remained 300mg bid  Background:  continuous, with predominantly alpha and beta frequencies.  Voltage:  Normal (20+ uV)  Organization:  Appropriate anterior-posterior gradient  Posterior Dominant Rhythm:  10 Hz symmetric, well-organized, and well-modulated  Sleep:  Symmetric, synchronous spindles and K complexes.  Focal abnormalities:  No persistent asymmetries of voltage or frequency.  Spontaneous Activity:  Occasional ( >1/hr < 1/min)  Left frontotemporal (F7)  Epileptiform sharp waves ( msec) during sleep.   Events:  No definite clinical or electrographic events.  Provocations:  •	Hyperventilation: was not performed.  •	Photic stimulation: did not evoke EEG abnormalities.  Daily Summary:    •	There were epileptiform discharges over the left fronto-temporal region perhaps slightly more obvious especially during sleep, maximal at F7.  •	None of the patient's typical events were recorded.    Mariano Ziegler MD  Attending Neurologist, [facility] Epilepsy Program      Daily Updates (from 07:00 am until 07:00 am):  Day 4  Oct 3-4, 2024:   Medications:  LEV ->750mg/250, LTG remained 300mg bid  Background:  continuous, with predominantly alpha and beta frequencies.  Voltage:  Normal (20+ uV)  Organization:  Appropriate anterior-posterior gradient  Posterior Dominant Rhythm:  10 Hz symmetric, well-organized, and well-modulated  Sleep:  Symmetric, synchronous spindles and K complexes.  Focal abnormalities:  No persistent asymmetries of voltage or frequency.  Spontaneous Activity:  Occasional ( >1/hr < 1/min)  Left frontotemporal (F7)  Epileptiform sharp waves ( msec) during sleep.   Events:  No definite clinical or electrographic events.  Provocations:  •	Hyperventilation: was not performed.  •	Photic stimulation: did not evoke EEG abnormalities.  Daily Summary:    •	There were epileptiform discharges over the left fronto-temporal region perhaps slightly more obvious especially during sleep, maximal at F7.  •	None of the patient's typical events were recorded.    Mariano Ziegler MD  Attending Neurologist, [facility] Epilepsy [facility]        Daily Updates (from 07:00 am until 07:00 am):  Day 5  Oct 4-5, 2024:   Medications:   AM, LTG 200mg tid  Background:  continuous, with predominantly alpha and beta frequencies.  Voltage:  Normal (20+ uV)  Organization:  Appropriate anterior-posterior gradient  Posterior Dominant Rhythm:  10 Hz symmetric, well-organized, and well-modulated  Sleep:  Symmetric, synchronous spindles and K complexes.  Focal abnormalities:  No persistent asymmetries of voltage or frequency.  Spontaneous Activity:  Occasional ( >1/hr < 1/min)  Left frontotemporal (F7)  Epileptiform sharp waves ( msec) during drowsiness and sleep only.   Events:  No definite clinical or electrographic events.  Provocations:  •	Hyperventilation: was not performed.  •	Photic stimulation: did not evoke EEG abnormalities.  Daily Summary:    •	There were epileptiform discharges over the left fronto-temporal region perhaps slightly more obvious especially during drowsiness and sleep, maximal at F7.  •	None of the patient's typical events were recorded.    Disha Askew DO  Attending Neurologist, [facility] Epilepsy Program      Daily Updates (from 07:00 am until 07:00 am):  Day 6  Oct 5-6, 2024:   Medications:   AM, LTG 200mg tid  Background:  continuous, with predominantly alpha and beta frequencies.  Voltage:  Normal (20+ uV)  Organization:  Appropriate anterior-posterior gradient  Posterior Dominant Rhythm:  10 Hz symmetric, well-organized, and well-modulated  Sleep:  Symmetric, synchronous spindles and K complexes.  Focal abnormalities:  No persistent asymmetries of voltage or frequency.  Spontaneous Activity:  Occasional ( >1/hr < 1/min)  Left frontotemporal (F7)  Epileptiform sharp waves ( msec) during drowsiness and sleep only.   Events:  No definite clinical or electrographic events.  Provocations:  •	Hyperventilation: was not performed.  •	Photic stimulation: did not evoke EEG abnormalities.  Daily Summary:    •	There were epileptiform discharges over the left fronto-temporal region perhaps slightly more obvious especially during drowsiness and sleep, maximal at F7.  •	None of the patient's typical events were recorded.    Brenda Ward MD  Attending Neurologist, [facility] Epilepsy Program         [facility] Department of Neurology  Epilepsy Monitoring Unit video-Electroencephalogram  [address] [place] [address], [address] [place] [id number], T: [phone]    Patient Name:	JOLEEN RIVERA    :	1995  MRN:	[id number]    Study Start Date/Time:	2024, 4:21:24 PM  Study End Date/Time:    Referred by:  Brenda Ward MD    Brief Clinical History:  JOLEEN RIVERA is a 29 year old Female with history of epilepsy, focal seizures (auras of déjà vu) and convulsions; study performed to investigate for seizures or markers of epilepsy.   Technologist notes: -  Diagnosis Code:  R56.9 convulsions/seizure    Patient Medication:  LTG 300mg bid    Acquisition Details:  Electroencephalography was acquired using a minimum of 21 channels on an [employer] Neu[employer] system v 9.3.1 with electrode placement according to the standard International 10-20 system following ACNS (American Clinical Neurophysiology Society) guidelines.  Anterior temporal T1 and T2 electrodes were utilized whenever possible.  The XLTEK automated spike & seizure detections were reviewed in detail, in addition to the entire raw EEG.  The live video was monitored continuously by trained technicians to identify events and specialty nurses trained in seizure management supervised the care of the patient in the epilepsy unit.    Findings:  Day 1 2024, 4:21:24 PM to next morning at 07:00 AM.  Background:  continuous, with predominantly alpha and beta frequencies.  Voltage:  Normal (20+ uV)  Organization:  Appropriate anterior-posterior gradient  Posterior Dominant Rhythm:  10 Hz symmetric, well-organized, and well-modulated  Sleep:  Symmetric, synchronous spindles and K complexes.  Focal abnormalities:  No persistent asymmetries of voltage or frequency.  Spontaneous Activity:  Occasional ( >1/hr < 1/min)  Left frontotemporal (F7)  Epileptiform sharp waves ( msec)   Events:  •	No electrographic seizures or significant clinical events occurred during this study.  Provocations:  •	Hyperventilation: did not evoke EEG abnormalities.  •	Photic stimulation: did not evoke EEG abnormalities.  Daily Summary:    •	There were epileptiform discharges over the left fronto-temporal region, maximal at F7.  •	The patient's typical events were not recorded during the study.      Mariano Ziegler MD  Attending Neurologist, [facility] Epilepsy Program            Daily Updates (from 07:00 am until 07:00 am):  Day 2  Oct 1-2, 2024:   Background:  continuous, with predominantly alpha and beta frequencies.  Voltage:  Normal (20+ uV)  Organization:  Appropriate anterior-posterior gradient  Posterior Dominant Rhythm:  10 Hz symmetric, well-organized, and well-modulated  Sleep:  Symmetric, synchronous spindles and K complexes.  Focal abnormalities:  No persistent asymmetries of voltage or frequency.  Spontaneous Activity:  Occasional ( >1/hr < 1/min)  Left frontotemporal (F7)  Epileptiform sharp waves ( msec) during sleep.   Events:  1 push button activation without change in EEG background:  d1 18:24:18		pauses here  d1 18:24:23		Patient Event activated  another aura-like feeling around 02:30 AM, without EEG correlate.  Provocations:  •	Hyperventilation @ 3:59 PM: pt felt numb, but HV did not evoke EEG abnormalities.  •	Photic stimulation: was not performed.  Daily Summary:    •	There were epileptiform discharges over the left fronto-temporal region perhaps slightly more obvious especially during sleep, maximal at F7.  •	Small versions of the patient's typical events was recorded and was without an electrographic correlate.      Mariano Ziegler MD  Attending Neurologist, [facility] Epilepsy Program          Daily Updates (from 07:00 am until 07:00 am):  Day 3  Oct 2-3, 2024:   Medications:  LEV ->750mg bid, LTG remained 300mg bid  Background:  continuous, with predominantly alpha and beta frequencies.  Voltage:  Normal (20+ uV)  Organization:  Appropriate anterior-posterior gradient  Posterior Dominant Rhythm:  10 Hz symmetric, well-organized, and well-modulated  Sleep:  Symmetric, synchronous spindles and K complexes.  Focal abnormalities:  No persistent asymmetries of voltage or frequency.  Spontaneous Activity:  Occasional ( >1/hr < 1/min)  Left frontotemporal (F7)  Epileptiform sharp waves ( msec) during sleep.   Events:  No definite clinical or electrographic events.  Provocations:  •	Hyperventilation: was not performed.  •	Photic stimulation: did not evoke EEG abnormalities.  Daily Summary:    •	There were epileptiform discharges over the left fronto-temporal region perhaps slightly more obvious especially during sleep, maximal at F7.  •	None of the patient's typical events were recorded.    Mariano Ziegler MD  Attending Neurologist, [facility] Epilepsy Program      Daily Updates (from 07:00 am until 07:00 am):  Day 4  Oct 3-4, 2024:   Medications:  LEV ->750mg/250, LTG remained 300mg bid  Background:  continuous, with predominantly alpha and beta frequencies.  Voltage:  Normal (20+ uV)  Organization:  Appropriate anterior-posterior gradient  Posterior Dominant Rhythm:  10 Hz symmetric, well-organized, and well-modulated  Sleep:  Symmetric, synchronous spindles and K complexes.  Focal abnormalities:  No persistent asymmetries of voltage or frequency.  Spontaneous Activity:  Occasional ( >1/hr < 1/min)  Left frontotemporal (F7)  Epileptiform sharp waves ( msec) during sleep.   Events:  No definite clinical or electrographic events.  Provocations:  •	Hyperventilation: was not performed.  •	Photic stimulation: did not evoke EEG abnormalities.  Daily Summary:    •	There were epileptiform discharges over the left fronto-temporal region perhaps slightly more obvious especially during sleep, maximal at F7.  •	None of the patient's typical events were recorded.    Mariano Ziegler MD  Attending Neurologist, [facility] Epilepsy [facility]        Daily Updates (from 07:00 am until 07:00 am):  Day 5  Oct 4-5, 2024:   Medications:   AM, LTG 200mg tid  Background:  continuous, with predominantly alpha and beta frequencies.  Voltage:  Normal (20+ uV)  Organization:  Appropriate anterior-posterior gradient  Posterior Dominant Rhythm:  10 Hz symmetric, well-organized, and well-modulated  Sleep:  Symmetric, synchronous spindles and K complexes.  Focal abnormalities:  No persistent asymmetries of voltage or frequency.  Spontaneous Activity:  Occasional ( >1/hr < 1/min)  Left frontotemporal (F7)  Epileptiform sharp waves ( msec) during drowsiness and sleep only.   Events:  No definite clinical or electrographic events.  Provocations:  •	Hyperventilation: was not performed.  •	Photic stimulation: did not evoke EEG abnormalities.  Daily Summary:    •	There were epileptiform discharges over the left fronto-temporal region perhaps slightly more obvious especially during drowsiness and sleep, maximal at F7.  •	None of the patient's typical events were recorded.    Disha Askew DO  Attending Neurologist, [facility] Epilepsy Program      Daily Updates (from 07:00 am until 07:00 am):  Day 6  Oct 5-6, 2024:   Medications:   AM, LTG 200mg tid  Background:  continuous, with predominantly alpha and beta frequencies.  Voltage:  Normal (20+ uV)  Organization:  Appropriate anterior-posterior gradient  Posterior Dominant Rhythm:  10 Hz symmetric, well-organized, and well-modulated  Sleep:  Symmetric, synchronous spindles and K complexes.  Focal abnormalities:  No persistent asymmetries of voltage or frequency.  Spontaneous Activity:  Occasional ( >1/hr < 1/min)  Left frontotemporal (F7)  Epileptiform sharp waves ( msec) during drowsiness and sleep only.   Events:  No definite clinical or electrographic events.  Provocations:  •	Hyperventilation: was not performed.  •	Photic stimulation: did not evoke EEG abnormalities.  Daily Summary:    •	There were epileptiform discharges over the left fronto-temporal region perhaps slightly more obvious especially during drowsiness and sleep, maximal at F7.  •	None of the patient's typical events were recorded.    Brenda Ward MD  Attending Neurologist, [facility] Epilepsy Program

## 2024-10-06 NOTE — PROGRESS NOTE ADULT - PROBLEM SELECTOR PLAN 1
Continue VEEG monitoring x 48hrs  - c/w Lamotrigine 200mg po TID (0900 - 1500 - 2100)  - will plan for resuming Levetiracetam tomorrow vs initiation of new AED  - Ativan 2mg IVP PRN for seizures > 2mins  - Vitals & Neurological assessment Q8hrs  - Maintain Seizure/Fall/Aspiration precautions  - f/u serum

## 2024-10-06 NOTE — PROGRESS NOTE ADULT - ASSESSMENT
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     Recommendations:    #Left choroidal fissure cyst  #Epilepsy  - vEEG ongoing; EEG report from 10/5 today:  •	There were epileptiform discharges over the left fronto-temporal region perhaps slightly more obvious especially during drowsiness and sleep, maximal at F7.  •	None of the patient's typical events were recorded.  - Neuro-Psych eval done  - seizure precautions  - management per epilepsy team    #Dizziness upon standing, now resolved   - encouraged to increase oral fluid intake  - reports no dizziness today 10/5  - fall precautions  - check orthostatic vitals if it recurs    #Tobacco and cannabis use  - smoking cessation counseling  - SW referral

## 2024-10-06 NOTE — PROGRESS NOTE ADULT - ATTENDING COMMENTS
No seizures or auras overnight.    Still felt some mild dizziness after lamotrigine 150mg IR in the morning, a much lower dose than prior (300mg), and LTG to continue at 200mg tid (total 600mg).    vEEG showing left temporal spikes occasionally, no seizures/aura correlate.    Planne for no LEV today, though subconsciously anxious about complete zero dose.    Plan:  1) continue LEV wean, but will delay complete wean, and provide 250mg or 250mg bid bid tmrw.
No seizures or auras overnight.    Feeling dizziness after lamotrigine 300mg IR in the morning and slept.    vEEG showing left temporal spikes occasionally.    Plan:  1) continue LEV wean, 750mg bid to 750-250 today and none scheduled for tmrw.
Plan as above
VEEG overnight unchanged, sleep activated L FT discharges. No typical aura events. Will give reduced Keppra dose 250mg x1 today and cont LTG. Mood better today. Activating procedures for

## 2024-10-06 NOTE — PROGRESS NOTE ADULT - SUBJECTIVE AND OBJECTIVE BOX
JOLEEN RIVERA 29y Female    INTERVAL EVENTS:   no acute events during interval/overnight.    SUBJECTIVE:  Patient seen and examined at bedside.    Rest of 12 point review of systems is negative except as stated above.    OBJECTIVE:  Vital Signs Last 24 Hrs  T(C): 36.3 (06 Oct 2024 06:20), Max: 36.7 (05 Oct 2024 21:19)  T(F): 97.4 (06 Oct 2024 06:20), Max: 98 (05 Oct 2024 21:19)  HR: 72 (06 Oct 2024 06:20) (72 - 73)  BP: 106/70 (06 Oct 2024 06:20) (106/70 - 117/78)  BP(mean): 73 (05 Oct 2024 21:19) (73 - 73)  RR: 18 (06 Oct 2024 06:20) (18 - 18)  SpO2: 99% (06 Oct 2024 06:20) (99% - 99%)    Parameters below as of 05 Oct 2024 21:19  Patient On (Oxygen Delivery Method): room air        PHYSICAL EXAM:  General: NAD. Speaking in full sentences. Resting in bed.   HEENT: NC/AT; PERRL, clear conjunctival; EEG in place  Neck: supple  Respiratory: CTA b/l, no retractions or accessory muscle use. No increased work of breathing.   Cardiovascular: +S1/S2; RRR  Abdomen: soft, NT/ND; +BS x4  Extremities: 2+ peripheral pulses b/l; no LE edema; right thumb wrapped in bandage after  accident resulting in laceration/stitches. No warmth, redness, edema surrounding bandage.   Skin: normal color and turgor; no rash; warm and well perfused  Neurological: AAOx3. No FND noted.  Psychiatry: Mood and Affect appropriate..    LABS:  No recent labs in past 48 hours.     MEDICATIONS:  acetaminophen     Tablet .. 650 milliGRAM(s) Oral every 6 hours PRN  folic acid 1 milliGRAM(s) Oral <User Schedule>  influenza   Vaccine 0.5 milliLiter(s) IntraMuscular once  lamoTRIgine 200 milliGRAM(s) Oral <User Schedule>  LORazepam   Injectable 2 milliGRAM(s) IV Push every 2 hours PRN    No Known Allergies   JOLEEN RIVERA 29y Female    INTERVAL EVENTS:   no acute events during interval/overnight.    SUBJECTIVE:  Patient seen and examined at bedside.  Feels well. no dizziness. Reports that she feels very relaxed here at the hospital and is trying to have extra coffee to trigger an aura. As stress is a common trigger for her aura/seizure events, she is not sure if she can elicit an event here.   No dysuria, walking to bathroom without dizziness, no cp, fever/chills, n/v, abdo pain.  Rest of 12 point review of systems is negative except as stated above.    OBJECTIVE:  Vital Signs Last 24 Hrs  T(C): 36.3 (06 Oct 2024 06:20), Max: 36.7 (05 Oct 2024 21:19)  T(F): 97.4 (06 Oct 2024 06:20), Max: 98 (05 Oct 2024 21:19)  HR: 72 (06 Oct 2024 06:20) (72 - 73)  BP: 106/70 (06 Oct 2024 06:20) (106/70 - 117/78)  BP(mean): 73 (05 Oct 2024 21:19) (73 - 73)  RR: 18 (06 Oct 2024 06:20) (18 - 18)  SpO2: 99% (06 Oct 2024 06:20) (99% - 99%)    Parameters below as of 05 Oct 2024 21:19  Patient On (Oxygen Delivery Method): room air    PHYSICAL EXAM:  General: NAD. Speaking in full sentences. Resting in bed.   HEENT: NC/AT; PERRL, clear conjunctival; EEG in place  Neck: supple  Respiratory: CTA b/l, no retractions or accessory muscle use. No increased work of breathing.   Cardiovascular: +S1/S2; RRR  Abdomen: soft, NT/ND; +BS x4  Extremities: 2+ peripheral pulses b/l; no LE edema; right thumb wrapped in bandage after  accident resulting in laceration/stitches. No warmth, redness, edema surrounding bandage.   Skin: normal color and turgor; no rash; warm and well perfused  Neurological: AAOx3. No FND noted.  Psychiatry: Mood and Affect appropriate..    LABS:  No recent labs in past 48 hours.     MEDICATIONS:  acetaminophen     Tablet .. 650 milliGRAM(s) Oral every 6 hours PRN  folic acid 1 milliGRAM(s) Oral <User Schedule>  influenza   Vaccine 0.5 milliLiter(s) IntraMuscular once  lamoTRIgine 200 milliGRAM(s) Oral <User Schedule>  LORazepam   Injectable 2 milliGRAM(s) IV Push every 2 hours PRN    No Known Allergies

## 2024-10-06 NOTE — PROGRESS NOTE ADULT - ASSESSMENT
29-year-old right-handed Female with PMHx of seizures presents for assessment of level of seizure and possible medication adjustment while undergoing vEEG monitoring. Patient reports multiple episodes of feeling lion vu and heart racing (auras) in 2021, that she originally thought were panic attacks and in January 2022 had her first GTC seizure. In April 2022 she was diagnosed with Epilepsy. Her most recent seizure was in July 2023 and since then she had multiple auras in 2024 despite being on Keppra 1500mg BID and Lamotrigine 400mg/300mg. vEEG placed on 09/29/2024 shows epileptiform discharges over the left fronto-temporal region thus far. No seizure despite holding AEDs and trigger induction methods. No reported auras w/ discontinuation of her levetiracetam. Mood issues and dizziness has resolved. EEG remained stable. Plan on resuming LEV vs new AED tomorrow and pending discharge 10/08.

## 2024-10-06 NOTE — PROGRESS NOTE ADULT - SUBJECTIVE AND OBJECTIVE BOX
Inteval history:    No events overnight. No complaints currently.    ROS  As above, otherwise negative for constitutional/HEENT/CV/pulm/GI//MSK/neuro/derm/endocrine/psych.     MEDICATIONS  (STANDING):  folic acid 1 milliGRAM(s) Oral <User Schedule>  influenza   Vaccine 0.5 milliLiter(s) IntraMuscular once  lamoTRIgine 200 milliGRAM(s) Oral <User Schedule>    MEDICATIONS  (PRN):  acetaminophen     Tablet .. 650 milliGRAM(s) Oral every 6 hours PRN Temp greater or equal to 38.5C (101.3F), Mild Pain (1 - 3)  LORazepam   Injectable 2 milliGRAM(s) IV Push every 2 hours PRN Seizure Activity      T(C): 36.3 (10-06-24 @ 06:20), Max: 36.7 (10-05-24 @ 21:19)  HR: 72 (10-06-24 @ 06:20) (72 - 73)  BP: 106/70 (10-06-24 @ 06:20) (106/70 - 117/78)  RR: 18 (10-06-24 @ 06:20) (18 - 18)  SpO2: 99% (10-06-24 @ 06:20) (99% - 99%)  Wt(kg): --    General:  Constitutional:  Sitting comfortably in NAD.  Ears, Nose, Throat: no abnormalities, mucus membranes moist  Neck: supple, no lymphadenopathy  Extremities: no edema, clubbing or cyanosis  Skin: no rash or neurocutaneous signs     Cognitive:  Orientation, language, memory and knowledge screens intact.    Cranial Nerves:  II: YOLI. III/IV/VI: EOM Full.  Absent nystagmus  V1V2V3: Symmetric, VII: Face appears symmetric VIII: Normal to screening, IX/X: Palate Elevates Symmetrical  XI: Trapezius Symmetric  XII: Tongue midline  Motor:  Power: no pronator drift, power 5/5 distal U/E  Tone: normal x 4 limbs  No tremor  Coordination/Gait:  Finger-nose intact, normal finger taps and rapid-alternating movements,   Narrow based gait, tandem forward   hops well on both feet  Reflexes:  DTR: 2+ symmetric all 4 limbs, no clonus      Investigations:                  EEG: Inteval history:    She is feeling well. NO Auras, NO seizure, No longer emotional and no recent episode of dizziness.   Painful IV today.     ROS  As above, otherwise negative for constitutional/HEENT/CV/pulm/GI//MSK/neuro/derm/endocrine/psych.     MEDICATIONS  (STANDING):  folic acid 1 milliGRAM(s) Oral <User Schedule>  influenza   Vaccine 0.5 milliLiter(s) IntraMuscular once  lamoTRIgine 200 milliGRAM(s) Oral <User Schedule>    MEDICATIONS  (PRN):  acetaminophen     Tablet .. 650 milliGRAM(s) Oral every 6 hours PRN Temp greater or equal to 38.5C (101.3F), Mild Pain (1 - 3)  LORazepam   Injectable 2 milliGRAM(s) IV Push every 2 hours PRN Seizure Activity      T(C): 36.3 (10-06-24 @ 06:20), Max: 36.7 (10-05-24 @ 21:19)  HR: 72 (10-06-24 @ 06:20) (72 - 73)  BP: 106/70 (10-06-24 @ 06:20) (106/70 - 117/78)  RR: 18 (10-06-24 @ 06:20) (18 - 18)  SpO2: 99% (10-06-24 @ 06:20) (99% - 99%)    General:  Constitutional:  Sitting comfortably in NAD.  Ears, Nose, Throat: no abnormalities, mucus membranes moist  Extremities: no edema, clubbing or cyanosis  Skin: no rash or neurocutaneous signs     Cognitive:  Orientation, language, memory and knowledge screens intact.    Cranial Nerves:  II: YOLI. III/IV/VI: EOM Full.  Absent nystagmus  V1V2V3: Symmetric, VII: Face appears symmetric VIII: Normal to screening, IX/X: Palate Elevates Symmetrical  XI: Trapezius Symmetric  XII: Tongue midline  Motor:  Power: no pronator drift, power 5/5 distal U/E  Tone: normal x 4 limbs  No tremor  Coordination/Gait:  Finger-nose intact, normal finger taps and rapid-alternating movements,   Narrow based gait, tandem forward   stable heel and toe walks   Reflexes:  DTR: 2+ symmetric all 4 limbs, no clonus, downgoing toes b/l       Interval history:    She is feeling well. NO Auras, NO seizure, No longer emotional and no recent episode of dizziness.   Painful IV today.     ROS  As above, otherwise negative for constitutional/HEENT/CV/pulm/GI//MSK/neuro/derm/endocrine/psych.     MEDICATIONS  (STANDING):  folic acid 1 milliGRAM(s) Oral <User Schedule>  influenza   Vaccine 0.5 milliLiter(s) IntraMuscular once  lamoTRIgine 200 milliGRAM(s) Oral <User Schedule>    MEDICATIONS  (PRN):  acetaminophen     Tablet .. 650 milliGRAM(s) Oral every 6 hours PRN Temp greater or equal to 38.5C (101.3F), Mild Pain (1 - 3)  LORazepam   Injectable 2 milliGRAM(s) IV Push every 2 hours PRN Seizure Activity      T(C): 36.3 (10-06-24 @ 06:20), Max: 36.7 (10-05-24 @ 21:19)  HR: 72 (10-06-24 @ 06:20) (72 - 73)  BP: 106/70 (10-06-24 @ 06:20) (106/70 - 117/78)  RR: 18 (10-06-24 @ 06:20) (18 - 18)  SpO2: 99% (10-06-24 @ 06:20) (99% - 99%)    General:  Constitutional:  Sitting comfortably in NAD.  Ears, Nose, Throat: no abnormalities, mucus membranes moist  Extremities: no edema, clubbing or cyanosis  Skin: no rash or neurocutaneous signs     Cognitive:  Orientation, language, memory and knowledge screens intact.    Cranial Nerves:  II: YOLI. III/IV/VI: EOM Full.  Absent nystagmus  V1V2V3: Symmetric, VII: Face appears symmetric VIII: Normal to screening, IX/X: Palate Elevates Symmetrical  XI: Trapezius Symmetric  XII: Tongue midline  Motor:  Power: no pronator drift, power 5/5 distal U/E  Tone: normal x 4 limbs  No tremor  Coordination/Gait:  Finger-nose intact, normal finger taps and rapid-alternating movements,   Narrow based gait, tandem forward   stable heel and toe walks   Reflexes:  DTR: 2+ symmetric all 4 limbs, no clonus, downgoing toes b/l

## 2024-10-07 LAB
ALBUMIN SERPL ELPH-MCNC: 4.5 G/DL — SIGNIFICANT CHANGE UP (ref 3.3–5)
ALP SERPL-CCNC: 71 U/L — SIGNIFICANT CHANGE UP (ref 40–120)
ALT FLD-CCNC: 34 U/L — SIGNIFICANT CHANGE UP (ref 10–45)
ANION GAP SERPL CALC-SCNC: 10 MMOL/L — SIGNIFICANT CHANGE UP (ref 5–17)
AST SERPL-CCNC: 32 U/L — SIGNIFICANT CHANGE UP (ref 10–40)
BILIRUB SERPL-MCNC: 0.4 MG/DL — SIGNIFICANT CHANGE UP (ref 0.2–1.2)
BUN SERPL-MCNC: 13 MG/DL — SIGNIFICANT CHANGE UP (ref 7–23)
CALCIUM SERPL-MCNC: 9.6 MG/DL — SIGNIFICANT CHANGE UP (ref 8.4–10.5)
CHLORIDE SERPL-SCNC: 106 MMOL/L — SIGNIFICANT CHANGE UP (ref 96–108)
CO2 SERPL-SCNC: 24 MMOL/L — SIGNIFICANT CHANGE UP (ref 22–31)
CREAT SERPL-MCNC: 0.72 MG/DL — SIGNIFICANT CHANGE UP (ref 0.5–1.3)
EGFR: 115 ML/MIN/1.73M2 — SIGNIFICANT CHANGE UP
GLUCOSE SERPL-MCNC: 83 MG/DL — SIGNIFICANT CHANGE UP (ref 70–99)
HCT VFR BLD CALC: 43.7 % — SIGNIFICANT CHANGE UP (ref 34.5–45)
HGB BLD-MCNC: 14.4 G/DL — SIGNIFICANT CHANGE UP (ref 11.5–15.5)
MAGNESIUM SERPL-MCNC: 2.3 MG/DL — SIGNIFICANT CHANGE UP (ref 1.6–2.6)
MCHC RBC-ENTMCNC: 28.7 PG — SIGNIFICANT CHANGE UP (ref 27–34)
MCHC RBC-ENTMCNC: 33 GM/DL — SIGNIFICANT CHANGE UP (ref 32–36)
MCV RBC AUTO: 87.1 FL — SIGNIFICANT CHANGE UP (ref 80–100)
NRBC # BLD: 0 /100 WBCS — SIGNIFICANT CHANGE UP (ref 0–0)
PHOSPHATE SERPL-MCNC: 3.6 MG/DL — SIGNIFICANT CHANGE UP (ref 2.5–4.5)
PLATELET # BLD AUTO: 254 K/UL — SIGNIFICANT CHANGE UP (ref 150–400)
POTASSIUM SERPL-MCNC: 4.1 MMOL/L — SIGNIFICANT CHANGE UP (ref 3.5–5.3)
POTASSIUM SERPL-SCNC: 4.1 MMOL/L — SIGNIFICANT CHANGE UP (ref 3.5–5.3)
PROT SERPL-MCNC: 7.6 G/DL — SIGNIFICANT CHANGE UP (ref 6–8.3)
RBC # BLD: 5.02 M/UL — SIGNIFICANT CHANGE UP (ref 3.8–5.2)
RBC # FLD: 12.9 % — SIGNIFICANT CHANGE UP (ref 10.3–14.5)
SODIUM SERPL-SCNC: 140 MMOL/L — SIGNIFICANT CHANGE UP (ref 135–145)
WBC # BLD: 5.9 K/UL — SIGNIFICANT CHANGE UP (ref 3.8–10.5)
WBC # FLD AUTO: 5.9 K/UL — SIGNIFICANT CHANGE UP (ref 3.8–10.5)

## 2024-10-07 PROCEDURE — 99232 SBSQ HOSP IP/OBS MODERATE 35: CPT

## 2024-10-07 PROCEDURE — 95720 EEG PHY/QHP EA INCR W/VEEG: CPT

## 2024-10-07 RX ORDER — LEVETIRACETAM 1000 MG
1500 TABLET ORAL ONCE
Refills: 0 | Status: COMPLETED | OUTPATIENT
Start: 2024-10-07 | End: 2024-10-07

## 2024-10-07 RX ORDER — LAMOTRIGINE 25 MG/1
300 TABLET ORAL
Refills: 0 | Status: DISCONTINUED | OUTPATIENT
Start: 2024-10-08 | End: 2024-10-08

## 2024-10-07 RX ORDER — LEVETIRACETAM 1000 MG
1500 TABLET ORAL
Refills: 0 | Status: DISCONTINUED | OUTPATIENT
Start: 2024-10-07 | End: 2024-10-08

## 2024-10-07 RX ORDER — LAMOTRIGINE 25 MG/1
200 TABLET ORAL
Refills: 0 | Status: COMPLETED | OUTPATIENT
Start: 2024-10-07 | End: 2024-10-07

## 2024-10-07 RX ADMIN — FOLIC ACID 1 MILLIGRAM(S): 1 TABLET ORAL at 09:09

## 2024-10-07 RX ADMIN — Medication 1500 MILLIGRAM(S): at 21:15

## 2024-10-07 RX ADMIN — LAMOTRIGINE 200 MILLIGRAM(S): 25 TABLET ORAL at 15:47

## 2024-10-07 RX ADMIN — LAMOTRIGINE 200 MILLIGRAM(S): 25 TABLET ORAL at 09:09

## 2024-10-07 RX ADMIN — LAMOTRIGINE 200 MILLIGRAM(S): 25 TABLET ORAL at 21:15

## 2024-10-07 RX ADMIN — Medication 1500 MILLIGRAM(S): at 13:32

## 2024-10-07 NOTE — EEG REPORT - NS EEG TEXT BOX
[facility], [place] Department of Neurology  Epilepsy Monitoring Unit video-Electroencephalogram  [address] [place] [address], [address] [place] [id number], T: [phone]    Patient Name:	JOLEEN RIVERA    :	1995  MRN:	[id number]    Study Start Date/Time:	2024, 4:21:24 PM  Study End Date/Time:    Referred by:  Brenda Ward MD    Brief Clinical History:  JOLEEN RIVERA is a 29 year old Female with history of epilepsy, focal seizures (auras of déjà vu) and convulsions; study performed to investigate for seizures or markers of epilepsy.   Technologist notes: -  Diagnosis Code:  R56.9 convulsions/seizure    Patient Medication:  LTG 300mg bid    Acquisition Details:  Electroencephalography was acquired using a minimum of 21 channels on an [employer] system v 9.3.1 with electrode placement according to the standard International 10-20 system following ACNS (American Clinical Neurophysiology Society) guidelines.  Anterior temporal T1 and T2 electrodes were utilized whenever possible.  The [employer] automated spike & seizure detections were reviewed in detail, in addition to the entire raw EEG.  The live video was monitored continuously by trained technicians to identify events and specialty nurses trained in seizure management supervised the care of the patient in the epilepsy unit.    Findings:  Day 1 2024, 4:21:24 PM to next morning at 07:00 AM.  Background:  continuous, with predominantly alpha and beta frequencies.  Voltage:  Normal (20+ uV)  Organization:  Appropriate anterior-posterior gradient  Posterior Dominant Rhythm:  10 Hz symmetric, well-organized, and well-modulated  Sleep:  Symmetric, synchronous spindles and K complexes.  Focal abnormalities:  No persistent asymmetries of voltage or frequency.  Spontaneous Activity:  Occasional ( >1/hr < 1/min)  Left frontotemporal (F7)  Epileptiform sharp waves ( msec)   Events:  •	No electrographic seizures or significant clinical events occurred during this study.  Provocations:  •	Hyperventilation: did not evoke EEG abnormalities.  •	Photic stimulation: was not performed.  Daily Summary:    •	There were epileptiform discharges over the left fronto-temporal region, maximal at F7.  •	The patient's typical events were not recorded during the study.      Mariano Ziegler MD  Attending Neurologist, [facility]            Daily Updates (from 07:00 am until 07:00 am):  Day 2  Oct 1-2, 2024:   Background:  continuous, with predominantly alpha and beta frequencies.  Voltage:  Normal (20+ uV)  Organization:  Appropriate anterior-posterior gradient  Posterior Dominant Rhythm:  10 Hz symmetric, well-organized, and well-modulated  Sleep:  Symmetric, synchronous spindles and K complexes.  Focal abnormalities:  No persistent asymmetries of voltage or frequency.  Spontaneous Activity:  Occasional ( >1/hr < 1/min)  Left frontotemporal (F7)  Epileptiform sharp waves ( msec) during sleep.   Events:  1 push button activation without change in EEG background:  d1 18:24:18		pauses here  d1 18:24:23		Patient Event activated  another aura-like feeling around 02:30 AM, without EEG correlate.  Provocations:  •	Hyperventilation @ 3:59 PM: pt felt numb, but HV did not evoke EEG abnormalities.  •	Photic stimulation: was not performed.  Daily Summary:    •	There were epileptiform discharges over the left fronto-temporal region perhaps slightly more obvious especially during sleep, maximal at F7.  •	Small versions of the patient's typical events was recorded and was without an electrographic correlate.      Mariano Ziegler MD  Attending Neurologist, [facility]          Daily Updates (from 07:00 am until 07:00 am):  Day 3  Oct 2-3, 2024:   Medications:  LEV ->750mg bid, LTG remained 300mg bid  Background:  continuous, with predominantly alpha and beta frequencies.  Voltage:  Normal (20+ uV)  Organization:  Appropriate anterior-posterior gradient  Posterior Dominant Rhythm:  10 Hz symmetric, well-organized, and well-modulated  Sleep:  Symmetric, synchronous spindles and K complexes.  Focal abnormalities:  No persistent asymmetries of voltage or frequency.  Spontaneous Activity:  Occasional ( >1/hr < 1/min)  Left frontotemporal (F7)  Epileptiform sharp waves ( msec) during sleep.   Events:  No definite clinical or electrographic events.  Provocations:  •	Hyperventilation: was not performed.  •	Photic stimulation: did not evoke EEG abnormalities.  Daily Summary:    •	There were epileptiform discharges over the left fronto-temporal region perhaps slightly more obvious especially during sleep, maximal at F7.  •	None of the patient's typical events were recorded.    Mariano Ziegler MD  Attending Neurologist, [facility]      Daily Updates (from 07:00 am until 07:00 am):  Day 4  Oct 3-4, 2024:   Medications:  LEV ->750mg/250, LTG remained 300mg bid  Background:  continuous, with predominantly alpha and beta frequencies.  Voltage:  Normal (20+ uV)  Organization:  Appropriate anterior-posterior gradient  Posterior Dominant Rhythm:  10 Hz symmetric, well-organized, and well-modulated  Sleep:  Symmetric, synchronous spindles and K complexes.  Focal abnormalities:  No persistent asymmetries of voltage or frequency.  Spontaneous Activity:  Occasional ( >1/hr < 1/min)  Left frontotemporal (F7)  Epileptiform sharp waves ( msec) during sleep.   Events:  No definite clinical or electrographic events.  Provocations:  •	Hyperventilation: was not performed.  •	Photic stimulation: did not evoke EEG abnormalities.  Daily Summary:    •	There were epileptiform discharges over the left fronto-temporal region perhaps slightly more obvious especially during sleep, maximal at F7.  •	None of the patient's typical events were recorded.    Mariano Ziegler MD  Attending Neurologist, [facility]        Daily Updates (from 07:00 am until 07:00 am):  Day 5  Oct 4-5, 2024:   Medications:   AM, LTG 200mg tid  Background:  continuous, with predominantly alpha and beta frequencies.  Voltage:  Normal (20+ uV)  Organization:  Appropriate anterior-posterior gradient  Posterior Dominant Rhythm:  10 Hz symmetric, well-organized, and well-modulated  Sleep:  Symmetric, synchronous spindles and K complexes.  Focal abnormalities:  No persistent asymmetries of voltage or frequency.  Spontaneous Activity:  Occasional ( >1/hr < 1/min)  Left frontotemporal (F7)  Epileptiform sharp waves ( msec) during drowsiness and sleep only.   Events:  No definite clinical or electrographic events.  Provocations:  •	Hyperventilation: was not performed.  •	Photic stimulation: did not evoke EEG abnormalities.  Daily Summary:    •	There were epileptiform discharges over the left fronto-temporal region perhaps slightly more obvious especially during drowsiness and sleep, maximal at F7.  •	None of the patient's typical events were recorded.    Disha Askew DO  Attending Neurologist, [facility]      Daily Updates (from 07:00 am until 07:00 am):  Day 6  Oct 5-6, 2024:   Medications:   AM, LTG 200mg tid  Background:  continuous, with predominantly alpha and beta frequencies.  Voltage:  Normal (20+ uV)  Organization:  Appropriate anterior-posterior gradient  Posterior Dominant Rhythm:  10 Hz symmetric, well-organized, and well-modulated  Sleep:  Symmetric, synchronous spindles and K complexes.  Focal abnormalities:  No persistent asymmetries of voltage or frequency.  Spontaneous Activity:  Occasional ( >1/hr < 1/min)  Left frontotemporal (F7)  Epileptiform sharp waves ( msec) during drowsiness and sleep only.   Events:  No definite clinical or electrographic events.  Provocations:  •	Hyperventilation: did not evoke EEG abnormalities.  •	Photic stimulation: did not evoke EEG abnormalities.  Daily Summary:    •	There were epileptiform discharges over the left fronto-temporal region perhaps slightly more obvious especially during drowsiness and sleep, maximal at F7.  •	None of the patient's typical events were recorded.    Brenda Ward MD  Attending Neurologist, [facility]          Daily Updates (from 07:00 am until 07:00 am):  Day 6  Oct 5-6, 2024:   Medications:   AM, LTG 200mg tid  Background:  continuous, with predominantly alpha and beta frequencies.  Voltage:  Normal (20+ uV)  Organization:  Appropriate anterior-posterior gradient  Posterior Dominant Rhythm:  10 Hz symmetric, well-organized, and well-modulated  Sleep:  Symmetric, synchronous spindles and K complexes.  Focal abnormalities:  No persistent asymmetries of voltage or frequency.  Spontaneous Activity:  Occasional ( >1/hr < 1/min)  Left frontotemporal (F7)  Epileptiform sharp waves ( msec) during drowsiness and sleep only.   Events:  Push button activation at 3:42 PM for aura reported (fluctuating dejavu feeling) with no clear associated epileptiform abnormalities. There was prominent electrode artifact   Provocations:  •	Hyperventilation: was not performed.  •	Photic stimulation: was not performed.  Daily Summary:    •	There were epileptiform discharges over the left fronto-temporal region perhaps slightly more obvious especially during drowsiness and sleep, maximal at F7.  •	Push button activation at 3:42 PM for aura reported (fluctuating dejavu/anxiety feeling) with no clear associated epileptiform abnormalities. There was prominent electrode artifact    Brenda Ward MD  Attending Neurologist, [facility]

## 2024-10-07 NOTE — PROGRESS NOTE ADULT - ASSESSMENT
29-year-old right-handed Female with PMHx of seizures presents for assessment of level of seizure and possible medication adjustment while undergoing vEEG monitoring. Patient reports multiple episodes of feeling lion vu and heart racing (auras) in 2021, that she originally thought were panic attacks and in January 2022 had her first GTC seizure. In April 2022 she was diagnosed with Epilepsy. Her most recent seizure was in July 2023 and since then she had multiple auras in 2024 despite being on Keppra 1500mg BID and Lamotrigine 400mg/300mg. vEEG placed on 09/29/2024 shows epileptiform discharges over the left fronto-temporal region thus far. No seizure despite holding AEDs and trigger induction methods (10/1-10/2). No reported auras w/ discontinuation of her levetiracetam (10/6/24). Plan on resuming LEV vs new AED today and pending discharge tomorrow 10/08. 29-year-old right-handed Female with PMHx of seizures who was admitted on 9/30/24 for an assessment of her level of seizure control, and potential medication adjustment while undergoing vEEG monitoring. vEEG was placed on 09/29/2024 that shows epileptiform discharges over the left fronto-temporal region thus far. No seizures to date despite holding AEDs and trigger induction methods (10/1-10/2). No reported auras or seizures with discontinuation of her levetiracetam (10/6/24).

## 2024-10-07 NOTE — PROGRESS NOTE ADULT - ASSESSMENT
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     #Left choroidal fissure cyst  #Epilepsy  - vEEG ongoing  - continues on lamotrigine  - rest of plan per epilepsy team    #Dizziness upon standing, resolved    #Tobacco and cannabis use  - smoking cessation counseling  -  referral    35 minutes spent on this encounter, including face to face with patient, care coordination and documentation.   Plan discussed with epilepsy team.

## 2024-10-07 NOTE — PROGRESS NOTE ADULT - SUBJECTIVE AND OBJECTIVE BOX
EPILEPSY PROGRESS NOTE:  Pt reports no aura or seizures overnight but had one "little one (aura)" yesterday which she reports was shorter in duration compared to other auras. No complaints currently.    REVIEW OF SYSTEMS:  As above, otherwise negative for constitutional/HEENT/CV/pulm/GI//MSK/neuro/derm/endocrine/psych.    MEDICATIONS:   MEDICATIONS  (STANDING):  folic acid 1 milliGRAM(s) Oral <User Schedule>  influenza   Vaccine 0.5 milliLiter(s) IntraMuscular once  lamoTRIgine 200 milliGRAM(s) Oral <User Schedule>    MEDICATIONS  (PRN):  acetaminophen     Tablet .. 650 milliGRAM(s) Oral every 6 hours PRN Temp greater or equal to 38.5C (101.3F), Mild Pain (1 - 3)  LORazepam   Injectable 2 milliGRAM(s) IV Push every 2 hours PRN Seizure Activity      VITAL SIGNS:  T(C): 36.6 (10-07-24 @ 06:56), Max: 36.7 (10-06-24 @ 12:39)  HR: 64 (10-07-24 @ 06:56) (64 - 75)  BP: 109/74 (10-07-24 @ 06:56) (104/70 - 109/74)  RR: 16 (10-07-24 @ 06:56) (16 - 18)  SpO2: 97% (10-07-24 @ 06:56) (97% - 98%)      PHYSICAL EXAM:  Eyes open spontaneously. Conversational with appropriate sentences.  Face symmetrical.  Actively moving all extremities    LABS:  CBC Full  -  ( 07 Oct 2024 05:30 )  WBC Count : 5.90 K/uL  RBC Count : 5.02 M/uL  Hemoglobin : 14.4 g/dL  Hematocrit : 43.7 %  Platelet Count - Automated : 254 K/uL  Mean Cell Volume : 87.1 fl  Mean Cell Hemoglobin : 28.7 pg  Mean Cell Hemoglobin Concentration : 33.0 gm/dL  Auto Neutrophil # : x  Auto Lymphocyte # : x  Auto Monocyte # : x  Auto Eosinophil # : x  Auto Basophil # : x  Auto Neutrophil % : x  Auto Lymphocyte % : x  Auto Monocyte % : x  Auto Eosinophil % : x  Auto Basophil % : x    10-07    140  |  106  |  13  ----------------------------<  83  4.1   |  24  |  0.72    Ca    9.6      07 Oct 2024 05:30  Phos  3.6     10-07  Mg     2.3     10-07    TPro  7.6  /  Alb  4.5  /  TBili  0.4  /  DBili  x   /  AST  32  /  ALT  34  /  AlkPhos  71  10-07    LIVER FUNCTIONS - ( 07 Oct 2024 05:30 )  Alb: 4.5 g/dL / Pro: 7.6 g/dL / ALK PHOS: 71 U/L / ALT: 34 U/L / AST: 32 U/L / GGT: x                 EEG:    "Daily Summary:    •	There were epileptiform discharges over the left fronto-temporal region perhaps slightly more obvious especially during drowsiness and sleep, maximal at F7." EPILEPSY PROGRESS NOTE:  Pt reports no aura or seizures overnight but had one "little one (aura)" yesterday which she reports was shorter in duration compared to other auras. No complaints currently.    REVIEW OF SYSTEMS:  CARDIOVASCULAR:  No chest pain, chest pressure or chest discomfort. No palpitations or edema.  RESPIRATORY:  No shortness of breath, cough or sputum.  GASTROINTESTINAL:  No anorexia, nausea, vomiting or diarrhea. No abdominal pain or blood.  GENITOURINARY: No Burning on urination.   NEUROLOGICAL:  see HPI    MEDICATIONS:   MEDICATIONS  (STANDING):  folic acid 1 milliGRAM(s) Oral <User Schedule>  influenza   Vaccine 0.5 milliLiter(s) IntraMuscular once  lamoTRIgine 200 milliGRAM(s) Oral <User Schedule>    MEDICATIONS  (PRN):  acetaminophen     Tablet .. 650 milliGRAM(s) Oral every 6 hours PRN Temp greater or equal to 38.5C (101.3F), Mild Pain (1 - 3)  LORazepam   Injectable 2 milliGRAM(s) IV Push every 2 hours PRN Seizure Activity    VITAL SIGNS:  T(C): 36.6 (10-07-24 @ 06:56), Max: 36.7 (10-06-24 @ 12:39)  HR: 64 (10-07-24 @ 06:56) (64 - 75)  BP: 109/74 (10-07-24 @ 06:56) (104/70 - 109/74)  RR: 16 (10-07-24 @ 06:56) (16 - 18)  SpO2: 97% (10-07-24 @ 06:56) (97% - 98%)      PHYSICAL EXAM:  Eyes open spontaneously. Conversational with appropriate sentences.  Face symmetrical.  Actively moving all extremities without any signs of focal deficits.     LABS:  CBC Full  -  ( 07 Oct 2024 05:30 )  WBC Count : 5.90 K/uL  RBC Count : 5.02 M/uL  Hemoglobin : 14.4 g/dL  Hematocrit : 43.7 %  Platelet Count - Automated : 254 K/uL  Mean Cell Volume : 87.1 fl  Mean Cell Hemoglobin : 28.7 pg  Mean Cell Hemoglobin Concentration : 33.0 gm/dL  Auto Neutrophil # : x  Auto Lymphocyte # : x  Auto Monocyte # : x  Auto Eosinophil # : x  Auto Basophil # : x  Auto Neutrophil % : x  Auto Lymphocyte % : x  Auto Monocyte % : x  Auto Eosinophil % : x  Auto Basophil % : x    10-07    140  |  106  |  13  ----------------------------<  83  4.1   |  24  |  0.72    Ca    9.6      07 Oct 2024 05:30  Phos  3.6     10-07  Mg     2.3     10-07    TPro  7.6  /  Alb  4.5  /  TBili  0.4  /  DBili  x   /  AST  32  /  ALT  34  /  AlkPhos  71  10-07    LIVER FUNCTIONS - ( 07 Oct 2024 05:30 )  Alb: 4.5 g/dL / Pro: 7.6 g/dL / ALK PHOS: 71 U/L / ALT: 34 U/L / AST: 32 U/L / GGT: x                 EEG:    "Daily Summary (10/6/24):    •	There were epileptiform discharges over the left fronto-temporal region perhaps slightly more obvious especially during drowsiness and sleep, maximal at F7." EPILEPSY PROGRESS NOTE:  Pt reports no aura or seizures overnight but had one "little one (aura)" yesterday which she reports was shorter in duration compared to other auras. No complaints currently.    REVIEW OF SYSTEMS:  CARDIOVASCULAR:  No chest pain, chest pressure or chest discomfort. No palpitations or edema.  RESPIRATORY:  No shortness of breath, cough or sputum.  GASTROINTESTINAL:  No anorexia, nausea, vomiting or diarrhea. No abdominal pain or blood.  GENITOURINARY: No Burning on urination.   NEUROLOGICAL:  see HPI    MEDICATIONS:   MEDICATIONS  (STANDING):  folic acid 1 milliGRAM(s) Oral <User Schedule>  influenza   Vaccine 0.5 milliLiter(s) IntraMuscular once  lamoTRIgine 200 milliGRAM(s) Oral <User Schedule>    MEDICATIONS  (PRN):  acetaminophen     Tablet .. 650 milliGRAM(s) Oral every 6 hours PRN Temp greater or equal to 38.5C (101.3F), Mild Pain (1 - 3)  LORazepam   Injectable 2 milliGRAM(s) IV Push every 2 hours PRN Seizure Activity    VITAL SIGNS:  T(C): 36.6 (10-07-24 @ 06:56), Max: 36.7 (10-06-24 @ 12:39)  HR: 64 (10-07-24 @ 06:56) (64 - 75)  BP: 109/74 (10-07-24 @ 06:56) (104/70 - 109/74)  RR: 16 (10-07-24 @ 06:56) (16 - 18)  SpO2: 97% (10-07-24 @ 06:56) (97% - 98%)      PHYSICAL EXAM:  Eyes open spontaneously. Conversational with appropriate sentences.  Face symmetrical.  Actively moving all extremities without any signs of focal deficits.     LABS:  CBC Full  -  ( 07 Oct 2024 05:30 )  WBC Count : 5.90 K/uL  RBC Count : 5.02 M/uL  Hemoglobin : 14.4 g/dL  Hematocrit : 43.7 %  Platelet Count - Automated : 254 K/uL  Mean Cell Volume : 87.1 fl  Mean Cell Hemoglobin : 28.7 pg  Mean Cell Hemoglobin Concentration : 33.0 gm/dL  Auto Neutrophil # : x  Auto Lymphocyte # : x  Auto Monocyte # : x  Auto Eosinophil # : x  Auto Basophil # : x  Auto Neutrophil % : x  Auto Lymphocyte % : x  Auto Monocyte % : x  Auto Eosinophil % : x  Auto Basophil % : x    10-07    140  |  106  |  13  ----------------------------<  83  4.1   |  24  |  0.72    Ca    9.6      07 Oct 2024 05:30  Phos  3.6     10-07  Mg     2.3     10-07    TPro  7.6  /  Alb  4.5  /  TBili  0.4  /  DBili  x   /  AST  32  /  ALT  34  /  AlkPhos  71  10-07    LIVER FUNCTIONS - ( 07 Oct 2024 05:30 )  Alb: 4.5 g/dL / Pro: 7.6 g/dL / ALK PHOS: 71 U/L / ALT: 34 U/L / AST: 32 U/L / GGT: x                 EEG:  Daily Summary (10/7/24):    •	There were epileptiform discharges over the left fronto-temporal region perhaps slightly more obvious especially during drowsiness and sleep, maximal at F7.  •	Push button activation at 3:42 PM for aura reported (fluctuating dejavu/anxiety feeling) with no clear associated epileptiform abnormalities. There was prominent electrode artifact    "Daily Summary (10/6/24):    •	There were epileptiform discharges over the left fronto-temporal region perhaps slightly more obvious especially during drowsiness and sleep, maximal at F7."

## 2024-10-07 NOTE — PROGRESS NOTE ADULT - PROBLEM SELECTOR PLAN 1
Continue VEEG monitoring  - c/w Lamotrigine 200mg po TID (0900 - 1500 - 2100)  - will plan for resuming Levetiracetam vs initiation of new AED  - Serum Lamotrigine level 9.2 from 9/30  - Serum Keppra level 26.6 from 9/30  - Ativan 2mg IVP PRN for seizures > 2mins  - Vitals & Neurological assessment Q8hrs  - Maintain Seizure/Fall/Aspiration precautions Hx of GTC seizures since 2022 (diagnosed), and her most recent seizure was in July 2023 and since then she had multiple auras in 2024 despite being on Keppra 1500mg BID and Lamotrigine 400mg/300mg. She has been on 300mg BID of lamictal d/t pharmacy inability to fill the 100mg dose. She had no seizures and 2 auras since admission, and EEG showed left fronto-temporal epileptiform discharges.     - Continue VEEG monitoring  - c/w Lamotrigine 200mg po TID (0900 - 1500 - 2100)  - will plan for resuming Levetiracetam vs initiation of new AED  - Serum Lamotrigine level 9.2 from 9/30  - Serum Keppra level 26.6 from 9/30  - Ativan 2mg IVP PRN for seizures > 2mins  - Vitals & Neurological assessment Q8hrs  - Maintain Seizure/Fall/Aspiration precautions Hx of GTC seizures since 2022 (diagnosed), and her most recent seizure was in July 2023 and since then she had multiple auras in 2024 despite being on Keppra 1500mg BID and Lamotrigine 400mg/300mg. She has been on 300mg BID of lamictal d/t pharmacy inability to fill the 100mg dose. She had no seizures and 2 auras since admission, and EEG showed left fronto-temporal epileptiform discharges.     - Continue VEEG monitoring  - Restart Keppra 1500mg BID  - c/w Lamotrigine 200mg po TID (0900 - 1500 - 2100)  - will plan for resuming Levetiracetam vs initiation of new AED  - Serum Lamotrigine level 9.2 from 9/30  - Serum Keppra level 26.6 from 9/30  - Ativan 2mg IVP PRN for seizures > 2mins  - Vitals & Neurological assessment Q8hrs  - Maintain Seizure/Fall/Aspiration precautions

## 2024-10-07 NOTE — PROGRESS NOTE ADULT - SUBJECTIVE AND OBJECTIVE BOX
Really wants to go home soon. Getting frustrated being in the hospital.      Remaining ROS negative       PHYSICAL EXAM:    General: sitting up in bed, wearing EEG leads  HEENT: NC/AT; MMM  Cardiovascular: +S1/S2, RRR  Respiratory: CTA B/L; no W/R/R  Gastrointestinal: soft, NT/ND; +BSx4  Extremities: WWP; no edema  Neurological: speech fluent, no facial asymmetry, no acute/focal deficits noted  Psychiatric: pleasant mood and affect  Dermatologic: no appreciable wounds or damage to the skin    VITAL SIGNS:  Vital Signs Last 24 Hrs  T(C): 37.2 (07 Oct 2024 11:50), Max: 37.2 (07 Oct 2024 11:50)  T(F): 98.9 (07 Oct 2024 11:50), Max: 98.9 (07 Oct 2024 11:50)  HR: 74 (07 Oct 2024 11:50) (64 - 75)  BP: 124/75 (07 Oct 2024 11:50) (104/70 - 124/75)  BP(mean): --  RR: 16 (07 Oct 2024 11:50) (16 - 18)  SpO2: 96% (07 Oct 2024 11:50) (96% - 98%)    Parameters below as of 07 Oct 2024 11:50  Patient On (Oxygen Delivery Method): room air    MEDICATIONS:  MEDICATIONS  (STANDING):  folic acid 1 milliGRAM(s) Oral <User Schedule>  influenza   Vaccine 0.5 milliLiter(s) IntraMuscular once  lamoTRIgine 200 milliGRAM(s) Oral <User Schedule>    MEDICATIONS  (PRN):  acetaminophen     Tablet .. 650 milliGRAM(s) Oral every 6 hours PRN Temp greater or equal to 38.5C (101.3F), Mild Pain (1 - 3)  LORazepam   Injectable 2 milliGRAM(s) IV Push every 2 hours PRN Seizure Activity      ALLERGIES:  Allergies    No Known Allergies    Intolerances        LABS:                        14.4   5.90  )-----------( 254      ( 07 Oct 2024 05:30 )             43.7     10-07    140  |  106  |  13  ----------------------------<  83  4.1   |  24  |  0.72    Ca    9.6      07 Oct 2024 05:30  Phos  3.6     10-07  Mg     2.3     10-07    TPro  7.6  /  Alb  4.5  /  TBili  0.4  /  DBili  x   /  AST  32  /  ALT  34  /  AlkPhos  71  10-07      Urinalysis Basic - ( 07 Oct 2024 05:30 )    Color: x / Appearance: x / SG: x / pH: x  Gluc: 83 mg/dL / Ketone: x  / Bili: x / Urobili: x   Blood: x / Protein: x / Nitrite: x   Leuk Esterase: x / RBC: x / WBC x   Sq Epi: x / Non Sq Epi: x / Bacteria: x      CAPILLARY BLOOD GLUCOSE          RADIOLOGY & ADDITIONAL TESTS: Reviewed.

## 2024-10-07 NOTE — SBIRT NOTE ADULT - NSSBIRTDRGNOACTINTDET_GEN_A_CORE
Patient reports that she smokes marijuana from a "trusted source" in joint form daily. Patient reports that on a weekend she will have up to six joints, usually starting around 1pm until she goes to sleep. Patient reports that when working she only has a joint or two after work, never prior or during shifts. Patient feels comfortable with her marijuana use, though is open to thinking about how it affects her health if medical providers want to discuss it. Patient declines cessation resources.

## 2024-10-07 NOTE — PROGRESS NOTE ADULT - PROBLEM SELECTOR PLAN 2
Nutrition & Prophylaxis:    F: None  E:  K>4, Mg>2, Phos >3  N: Regular diet  DVT PPx: SCDs  GI PPx: None  Dispo: [id number]  Code: FULL CODE Nutrition & Prophylaxis:    F: None  E:  K>4, Mg>2, Phos >3  N: Regular diet  DVT PPx: SCDs  GI PPx: None  Dispo: 7 [facility]  Code: FULL CODE

## 2024-10-08 ENCOUNTER — NON-APPOINTMENT (OUTPATIENT)
Age: 29
End: 2024-10-08

## 2024-10-08 ENCOUNTER — TRANSCRIPTION ENCOUNTER (OUTPATIENT)
Age: 29
End: 2024-10-08

## 2024-10-08 VITALS
OXYGEN SATURATION: 99 % | HEART RATE: 62 BPM | SYSTOLIC BLOOD PRESSURE: 102 MMHG | RESPIRATION RATE: 17 BRPM | DIASTOLIC BLOOD PRESSURE: 66 MMHG | TEMPERATURE: 98 F

## 2024-10-08 PROCEDURE — 85025 COMPLETE CBC W/AUTO DIFF WBC: CPT

## 2024-10-08 PROCEDURE — 95720 EEG PHY/QHP EA INCR W/VEEG: CPT

## 2024-10-08 PROCEDURE — 95700 EEG CONT REC W/VID EEG TECH: CPT

## 2024-10-08 PROCEDURE — 80048 BASIC METABOLIC PNL TOTAL CA: CPT

## 2024-10-08 PROCEDURE — 36415 COLL VENOUS BLD VENIPUNCTURE: CPT

## 2024-10-08 PROCEDURE — 80053 COMPREHEN METABOLIC PANEL: CPT

## 2024-10-08 PROCEDURE — 85610 PROTHROMBIN TIME: CPT

## 2024-10-08 PROCEDURE — 80177 DRUG SCRN QUAN LEVETIRACETAM: CPT

## 2024-10-08 PROCEDURE — 85730 THROMBOPLASTIN TIME PARTIAL: CPT

## 2024-10-08 PROCEDURE — 80175 DRUG SCREEN QUAN LAMOTRIGINE: CPT

## 2024-10-08 PROCEDURE — 83735 ASSAY OF MAGNESIUM: CPT

## 2024-10-08 PROCEDURE — 95716 VEEG EA 12-26HR CONT MNTR: CPT

## 2024-10-08 PROCEDURE — 84702 CHORIONIC GONADOTROPIN TEST: CPT

## 2024-10-08 PROCEDURE — 99232 SBSQ HOSP IP/OBS MODERATE 35: CPT

## 2024-10-08 PROCEDURE — 93005 ELECTROCARDIOGRAM TRACING: CPT

## 2024-10-08 PROCEDURE — 85027 COMPLETE CBC AUTOMATED: CPT

## 2024-10-08 PROCEDURE — 84100 ASSAY OF PHOSPHORUS: CPT

## 2024-10-08 RX ORDER — LAMOTRIGINE 25 MG/1
2 TABLET ORAL
Refills: 0 | DISCHARGE

## 2024-10-08 RX ORDER — CLONAZEPAM 1 MG
1 TABLET ORAL
Qty: 3 | Refills: 0
Start: 2024-10-08 | End: 2024-10-16

## 2024-10-08 RX ORDER — LAMOTRIGINE 25 MG/1
1 TABLET ORAL
Qty: 0 | Refills: 0 | DISCHARGE

## 2024-10-08 RX ADMIN — LAMOTRIGINE 300 MILLIGRAM(S): 25 TABLET ORAL at 08:55

## 2024-10-08 RX ADMIN — FOLIC ACID 1 MILLIGRAM(S): 1 TABLET ORAL at 08:55

## 2024-10-08 RX ADMIN — Medication 1500 MILLIGRAM(S): at 08:56

## 2024-10-08 NOTE — DISCHARGE NOTE NURSING/CASE MANAGEMENT/SOCIAL WORK - PATIENT PORTAL LINK FT
You can access the FollowMyHealth Patient Portal offered by [facility] by registering at the following website: [email or web address]. By joining [facility]’s FollowMyHealth portal, you will also be able to view your health information using other applications (apps) compatible with our system.

## 2024-10-08 NOTE — PROGRESS NOTE ADULT - ASSESSMENT
29-year-old right-handed Female with PMHx of seizures who was admitted on 9/30/24 for an assessment of her level of seizure control, and potential medication adjustment while undergoing vEEG monitoring. vEEG was placed on 09/29/2024 that shows epileptiform discharges over the left fronto-temporal region thus far. No seizures to date despite holding AEDs and trigger induction methods (10/1-10/2). No reported auras or seizures with discontinuation of her levetiracetam (10/6/24). Restarted her Keppra 1500mg BID. Patient pending discharge today 10/8 on home meds Keppra 1500mg BID, Lamictal 300mg BI and Clonidine 0.5mg PRN  29-year-old right-handed Female with PMHx of seizures who was admitted on 9/30/24 for an assessment of her level of seizure control, and potential medication adjustment while undergoing vEEG monitoring. vEEG was placed on 09/29/2024 that shows epileptiform discharges over the left fronto-temporal region thus far. No seizures to date despite holding AEDs and trigger induction methods (10/1-10/2). No reported auras or seizures with discontinuation of her levetiracetam (10/6/24). Restarted her Keppra 1500mg BID. Patient pending discharge today 10/8 on home meds Keppra 1500mg BID, Lamictal 300mg BID and Clonidine 0.5mg PRN

## 2024-10-08 NOTE — PROGRESS NOTE ADULT - SUBJECTIVE AND OBJECTIVE BOX
No reported issues overnight. Patient is resting comfortably in bed.       Remaining ROS negative       PHYSICAL EXAM:    General: nad, resting in bed  HEENT: eeg leads applied  Respiratory: no respiratory distress, no use of accessory muscles    VITAL SIGNS:  Vital Signs Last 24 Hrs  T(C): 36.7 (08 Oct 2024 06:07), Max: 37.2 (07 Oct 2024 11:50)  T(F): 98.1 (08 Oct 2024 06:07), Max: 98.9 (07 Oct 2024 11:50)  HR: 62 (08 Oct 2024 06:07) (62 - 82)  BP: 102/66 (08 Oct 2024 06:07) (102/66 - 124/75)  BP(mean): 78 (08 Oct 2024 06:07) (78 - 78)  RR: 17 (08 Oct 2024 06:07) (16 - 17)  SpO2: 99% (08 Oct 2024 06:07) (96% - 99%)    Parameters below as of 08 Oct 2024 06:07  Patient On (Oxygen Delivery Method): room air      MEDICATIONS:  MEDICATIONS  (STANDING):  folic acid 1 milliGRAM(s) Oral <User Schedule>  influenza   Vaccine 0.5 milliLiter(s) IntraMuscular once  lamoTRIgine 300 milliGRAM(s) Oral <User Schedule>  levETIRAcetam 1500 milliGRAM(s) Oral <User Schedule>    MEDICATIONS  (PRN):  acetaminophen     Tablet .. 650 milliGRAM(s) Oral every 6 hours PRN Temp greater or equal to 38.5C (101.3F), Mild Pain (1 - 3)  LORazepam   Injectable 2 milliGRAM(s) IV Push every 2 hours PRN Seizure Activity      ALLERGIES:  Allergies    No Known Allergies    Intolerances        LABS:                        14.4   5.90  )-----------( 254      ( 07 Oct 2024 05:30 )             43.7     10-07    140  |  106  |  13  ----------------------------<  83  4.1   |  24  |  0.72    Ca    9.6      07 Oct 2024 05:30  Phos  3.6     10-07  Mg     2.3     10-07    TPro  7.6  /  Alb  4.5  /  TBili  0.4  /  DBili  x   /  AST  32  /  ALT  34  /  AlkPhos  71  10-07      Urinalysis Basic - ( 07 Oct 2024 05:30 )    Color: x / Appearance: x / SG: x / pH: x  Gluc: 83 mg/dL / Ketone: x  / Bili: x / Urobili: x   Blood: x / Protein: x / Nitrite: x   Leuk Esterase: x / RBC: x / WBC x   Sq Epi: x / Non Sq Epi: x / Bacteria: x      CAPILLARY BLOOD GLUCOSE          RADIOLOGY & ADDITIONAL TESTS: Reviewed.

## 2024-10-08 NOTE — DISCHARGE NOTE NURSING/CASE MANAGEMENT/SOCIAL WORK - NSDCPEFALRISK_GEN_ALL_CORE
For information on Fall & Injury Prevention, visit: [email or web address] OR  [email or web address] OR  https://www.cdc.gov/steadi/patient.html

## 2024-10-08 NOTE — PROGRESS NOTE ADULT - SUBJECTIVE AND OBJECTIVE BOX
EPILEPSY PROGRESS NOTE:  No events overnight. No complaints currently.    REVIEW OF SYSTEMS:  As above, otherwise negative for constitutional/HEENT/CV/pulm/GI//MSK/neuro/derm/endocrine/psych.    MEDICATIONS:   MEDICATIONS  (STANDING):  folic acid 1 milliGRAM(s) Oral <User Schedule>  influenza   Vaccine 0.5 milliLiter(s) IntraMuscular once  lamoTRIgine 300 milliGRAM(s) Oral <User Schedule>  levETIRAcetam 1500 milliGRAM(s) Oral <User Schedule>    MEDICATIONS  (PRN):  acetaminophen     Tablet .. 650 milliGRAM(s) Oral every 6 hours PRN Temp greater or equal to 38.5C (101.3F), Mild Pain (1 - 3)  LORazepam   Injectable 2 milliGRAM(s) IV Push every 2 hours PRN Seizure Activity      VITAL SIGNS:  T(C): 36.7 (10-08-24 @ 06:07), Max: 37.2 (10-07-24 @ 11:50)  HR: 62 (10-08-24 @ 06:07) (62 - 82)  BP: 102/66 (10-08-24 @ 06:07) (102/66 - 124/75)  RR: 17 (10-08-24 @ 06:07) (16 - 17)  SpO2: 99% (10-08-24 @ 06:07) (96% - 99%)  Wt(kg): --    PHYSICAL EXAM:  General:  Young woman, appearance is consistent with chronologic age.   Cognitive/Language:  Awake, alert, and oriented to person, place, time and date.  Recent and remote memory intact. Naming, repetition and comprehension intact. No dysarthria.    Cranial Nerves  - Eyes: Visual acuity intact, Visual fields full.  EOMI w/o nystagmus. PERRLA. 2mm Brisk  No ptosis/weakness of eyelid closure.    - Face: Facial sensation normal V1 - 3, no facial asymmetry.    - Ears/Nose/Throat:  Hearing grossly intact b/l to finger rub.  Palate elevates midline.  Tongue and uvula midline.   Motor exam: Normal tone and bulk. No tenderness, twitching, tremors or involuntary movements.   - MRC grading:          Upper extremity                  Bicep     Tricep     HG                                                 R      5/5        5/5          5/5                                                    L       5/5        5/5          5/5              Lower extremity                   HF        KE        DF         PF                                                  R     5/5       5/5       5/5       5/5                                               L      5/5      5/5       5/5        5/5  Sensory examination:  Intact to light touch in all extremities.  Reflexes: 2+ b/l biceps, triceps, patella and achilles.  Plantar response downgoing b/l.   Cerebellum: Finger To Nose intact.  No dysmetria.    Gait: Deferred      LABS:  CBC Full  -  ( 07 Oct 2024 05:30 )  WBC Count : 5.90 K/uL  RBC Count : 5.02 M/uL  Hemoglobin : 14.4 g/dL  Hematocrit : 43.7 %  Platelet Count - Automated : 254 K/uL  Mean Cell Volume : 87.1 fl  Mean Cell Hemoglobin : 28.7 pg  Mean Cell Hemoglobin Concentration : 33.0 gm/dL  Auto Neutrophil # : x  Auto Lymphocyte # : x  Auto Monocyte # : x  Auto Eosinophil # : x  Auto Basophil # : x  Auto Neutrophil % : x  Auto Lymphocyte % : x  Auto Monocyte % : x  Auto Eosinophil % : x  Auto Basophil % : x    10-07    140  |  106  |  13  ----------------------------<  83  4.1   |  24  |  0.72    Ca    9.6      07 Oct 2024 05:30  Phos  3.6     10-07  Mg     2.3     10-07    TPro  7.6  /  Alb  4.5  /  TBili  0.4  /  DBili  x   /  AST  32  /  ALT  34  /  AlkPhos  71  10-07    LIVER FUNCTIONS - ( 07 Oct 2024 05:30 )  Alb: 4.5 g/dL / Pro: 7.6 g/dL / ALK PHOS: 71 U/L / ALT: 34 U/L / AST: 32 U/L / GGT: x                 EEG:  Day 6  Oct 5-6, 2024:   Medications:   AM, LTG 200mg tid  Background:  continuous, with predominantly alpha and beta frequencies.  Voltage:  Normal (20+ uV)  Organization:  Appropriate anterior-posterior gradient  Posterior Dominant Rhythm:  10 Hz symmetric, well-organized, and well-modulated  Sleep:  Symmetric, synchronous spindles and K complexes.  Focal abnormalities:  No persistent asymmetries of voltage or frequency.  Spontaneous Activity:  Occasional ( >1/hr < 1/min)  Left frontotemporal (F7)  Epileptiform sharp waves ( msec) during drowsiness and sleep only.   Events:  Push button activation at 3:42 PM for aura reported (fluctuating dejavu feeling) with no clear associated epileptiform abnormalities. There was prominent electrode artifact   Provocations:  •	Hyperventilation: was not performed.  •	Photic stimulation: was not performed.  Daily Summary:    •	There were epileptiform discharges over the left fronto-temporal region perhaps slightly more obvious especially during drowsiness and sleep, maximal at F7.  •	Push button activation at 3:42 PM for aura reported (fluctuating dejavu/anxiety feeling) with no clear associated epileptiform abnormalities. There was prominent electrode artifact    Brenda Ward MD  (Signed 07-Oct-2024 13:57)  Attending Neurologist, [facility] Epilepsy Program EPILEPSY PROGRESS NOTE:  No events overnight. No complaints currently.    REVIEW OF SYSTEMS:  CONSTITUTIONAL:  No weight loss, fever, chills, weakness or fatigue.  HEENT:  Eyes:  No visual loss, blurred vision, double vision or yellow sclerae. Ears, Nose, Throat:  No hearing loss, sneezing, congestion, runny nose or sore throat.  SKIN:  No rash or itching.  CARDIOVASCULAR:  No chest pain, chest pressure or chest discomfort. No palpitations or edema.  RESPIRATORY:  No shortness of breath, cough or sputum.  GASTROINTESTINAL:  No anorexia, nausea, vomiting or diarrhea. No abdominal pain or blood.  GENITOURINARY: No Burning on urination.   NEUROLOGICAL:  see HPI  MUSCULOSKELETAL:  No muscle, back pain, joint pain or stiffness.  HEMATOLOGIC:  No anemia, bleeding or bruising.      MEDICATIONS:   MEDICATIONS  (STANDING):  folic acid 1 milliGRAM(s) Oral <User Schedule>  influenza   Vaccine 0.5 milliLiter(s) IntraMuscular once  lamoTRIgine 300 milliGRAM(s) Oral <User Schedule>  levETIRAcetam 1500 milliGRAM(s) Oral <User Schedule>    MEDICATIONS  (PRN):  acetaminophen     Tablet .. 650 milliGRAM(s) Oral every 6 hours PRN Temp greater or equal to 38.5C (101.3F), Mild Pain (1 - 3)  LORazepam   Injectable 2 milliGRAM(s) IV Push every 2 hours PRN Seizure Activity      VITAL SIGNS:  T(C): 36.7 (10-08-24 @ 06:07), Max: 37.2 (10-07-24 @ 11:50)  HR: 62 (10-08-24 @ 06:07) (62 - 82)  BP: 102/66 (10-08-24 @ 06:07) (102/66 - 124/75)  RR: 17 (10-08-24 @ 06:07) (16 - 17)  SpO2: 99% (10-08-24 @ 06:07) (96% - 99%)  Wt(kg): --    PHYSICAL EXAM:  General:  Young woman, appearance is consistent with chronologic age.   Cognitive/Language:  Awake, alert, and oriented to person, place, time and date.  Recent and remote memory intact. Naming, repetition and comprehension intact. No dysarthria.    Cranial Nerves  - Eyes: Visual acuity intact, Visual fields full.  EOMI w/o nystagmus. PERRLA. 2mm Brisk  No ptosis/weakness of eyelid closure.    - Face: Facial sensation normal V1 - 3, no facial asymmetry.    - Ears/Nose/Throat:  Hearing grossly intact b/l to finger rub.  Palate elevates midline.  Tongue and uvula midline.   Motor exam: Normal tone and bulk. No tenderness, twitching, tremors or involuntary movements.   - MRC grading:          Upper extremity                  Bicep     Tricep     HG                                                 R      5/5        5/5          5/5                                                    L       5/5        5/5          5/5              Lower extremity                   HF        KE        DF         PF                                                  R     5/5       5/5       5/5       5/5                                               L      5/5      5/5       5/5        5/5  Sensory examination:  Intact to light touch in all extremities.  Reflexes: 2+ b/l biceps, triceps, patella and achilles.  Plantar response downgoing b/l.   Cerebellum: Finger To Nose intact.  No dysmetria.    Gait: Deferred      LABS:  CBC Full  -  ( 07 Oct 2024 05:30 )  WBC Count : 5.90 K/uL  RBC Count : 5.02 M/uL  Hemoglobin : 14.4 g/dL  Hematocrit : 43.7 %  Platelet Count - Automated : 254 K/uL  Mean Cell Volume : 87.1 fl  Mean Cell Hemoglobin : 28.7 pg  Mean Cell Hemoglobin Concentration : 33.0 gm/dL  Auto Neutrophil # : x  Auto Lymphocyte # : x  Auto Monocyte # : x  Auto Eosinophil # : x  Auto Basophil # : x  Auto Neutrophil % : x  Auto Lymphocyte % : x  Auto Monocyte % : x  Auto Eosinophil % : x  Auto Basophil % : x    10-07    140  |  106  |  13  ----------------------------<  83  4.1   |  24  |  0.72    Ca    9.6      07 Oct 2024 05:30  Phos  3.6     10-07  Mg     2.3     10-07    TPro  7.6  /  Alb  4.5  /  TBili  0.4  /  DBili  x   /  AST  32  /  ALT  34  /  AlkPhos  71  10-07    LIVER FUNCTIONS - ( 07 Oct 2024 05:30 )  Alb: 4.5 g/dL / Pro: 7.6 g/dL / ALK PHOS: 71 U/L / ALT: 34 U/L / AST: 32 U/L / GGT: x                 EEG:  Day 6  Oct 5-6, 2024:   Medications:   AM, LTG 200mg tid  Background:  continuous, with predominantly alpha and beta frequencies.  Voltage:  Normal (20+ uV)  Organization:  Appropriate anterior-posterior gradient  Posterior Dominant Rhythm:  10 Hz symmetric, well-organized, and well-modulated  Sleep:  Symmetric, synchronous spindles and K complexes.  Focal abnormalities:  No persistent asymmetries of voltage or frequency.  Spontaneous Activity:  Occasional ( >1/hr < 1/min)  Left frontotemporal (F7)  Epileptiform sharp waves ( msec) during drowsiness and sleep only.   Events:  Push button activation at 3:42 PM for aura reported (fluctuating dejavu feeling) with no clear associated epileptiform abnormalities. There was prominent electrode artifact   Provocations:  •	Hyperventilation: was not performed.  •	Photic stimulation: was not performed.  Daily Summary:    •	There were epileptiform discharges over the left fronto-temporal region perhaps slightly more obvious especially during drowsiness and sleep, maximal at F7.  •	Push button activation at 3:42 PM for aura reported (fluctuating dejavu/anxiety feeling) with no clear associated epileptiform abnormalities. There was prominent electrode artifact    Brenda Ward MD  (Signed 07-Oct-2024 13:57)  Attending Neurologist, [facility] Epilepsy Program

## 2024-10-08 NOTE — PROGRESS NOTE ADULT - PROBLEM SELECTOR PLAN 1
Hx of GTC seizures since 2022 (diagnosed), and her most recent seizure was in July 2023 and since then she had multiple auras in 2024 despite being on Keppra 1500mg BID and Lamotrigine 400mg/300mg. She has been on 300mg BID of lamictal d/t pharmacy inability to fill the 100mg dose. She had no seizures and 2 auras since admission, and EEG showed left fronto-temporal epileptiform discharges.     - D/C vEEG  - Continue Keppra 1500mg BID  - Continue Lamotrigine 300mg BID  - Serum Lamotrigine level 9.2 from 9/30  - Serum Keppra level 26.6 from 9/30  - Ativan 2mg IVP PRN for seizures > 2mins  - Vitals & Neurological assessment Q8hrs  - Maintain Seizure/Fall/Aspiration precautions.

## 2024-10-08 NOTE — PROGRESS NOTE ADULT - PROBLEM SELECTOR PLAN 2
Nutrition & Prophylaxis:    F: None  E:  K>4, Mg>2, Phos >3  N: Regular diet  DVT PPx: SCDs  GI PPx: None  Dispo: 7 ENDER  Code: FULL CODE.

## 2024-10-08 NOTE — PROGRESS NOTE ADULT - ASSESSMENT
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     #Left choroidal fissure cyst  #Epilepsy  - vEEG ongoing  - continues on lamotrigine and keppra added  - rest of plan per epilepsy team    #Dizziness upon standing, resolved    #Tobacco and cannabis use  - smoking cessation counseling  - SW referral    Moderate level of medical decision making required for this case, including the presence of two stable chronic medical issues and discussion of plan with the epilepsy team.   Plan discussed with epilepsy team.

## 2024-10-09 PROBLEM — G40.909 EPILEPSY, UNSPECIFIED, NOT INTRACTABLE, WITHOUT STATUS EPILEPTICUS: Chronic | Status: ACTIVE | Noted: 2024-09-30

## 2024-10-17 ENCOUNTER — RX RENEWAL (OUTPATIENT)
Age: 29
End: 2024-10-17

## 2024-10-21 ENCOUNTER — RX RENEWAL (OUTPATIENT)
Age: 29
End: 2024-10-21

## 2024-11-13 ENCOUNTER — APPOINTMENT (OUTPATIENT)
Dept: NEUROLOGY | Facility: CLINIC | Age: 29
End: 2024-11-13

## 2024-11-20 ENCOUNTER — RX RENEWAL (OUTPATIENT)
Age: 29
End: 2024-11-20

## 2024-12-17 ENCOUNTER — RX RENEWAL (OUTPATIENT)
Age: 29
End: 2024-12-17

## 2024-12-26 ENCOUNTER — APPOINTMENT (OUTPATIENT)
Dept: NEUROLOGY | Facility: CLINIC | Age: 29
End: 2024-12-26
Payer: COMMERCIAL

## 2024-12-26 VITALS
WEIGHT: 127 LBS | HEIGHT: 63 IN | SYSTOLIC BLOOD PRESSURE: 117 MMHG | OXYGEN SATURATION: 97 % | TEMPERATURE: 98.7 F | HEART RATE: 71 BPM | DIASTOLIC BLOOD PRESSURE: 72 MMHG | BODY MASS INDEX: 22.5 KG/M2

## 2024-12-26 DIAGNOSIS — G40.109 LOCALIZATION-RELATED (FOCAL) (PARTIAL) SYMPTOMATIC EPILEPSY AND EPILEPTIC SYNDROMES WITH SIMPLE PARTIAL SEIZURES, NOT INTRACTABLE, W/OUT STATUS EPILEPTICUS: ICD-10-CM

## 2024-12-26 PROCEDURE — 99214 OFFICE O/P EST MOD 30 MIN: CPT

## 2024-12-26 PROCEDURE — G2211 COMPLEX E/M VISIT ADD ON: CPT

## 2025-02-25 ENCOUNTER — NON-APPOINTMENT (OUTPATIENT)
Age: 30
End: 2025-02-25

## 2025-02-25 ENCOUNTER — APPOINTMENT (OUTPATIENT)
Dept: NEUROLOGY | Facility: CLINIC | Age: 30
End: 2025-02-25
Payer: COMMERCIAL

## 2025-02-25 VITALS
OXYGEN SATURATION: 96 % | BODY MASS INDEX: 21.51 KG/M2 | WEIGHT: 121.4 LBS | HEART RATE: 80 BPM | TEMPERATURE: 97.52 F | DIASTOLIC BLOOD PRESSURE: 75 MMHG | SYSTOLIC BLOOD PRESSURE: 117 MMHG

## 2025-02-25 DIAGNOSIS — G40.109 LOCALIZATION-RELATED (FOCAL) (PARTIAL) SYMPTOMATIC EPILEPSY AND EPILEPTIC SYNDROMES WITH SIMPLE PARTIAL SEIZURES, NOT INTRACTABLE, W/OUT STATUS EPILEPTICUS: ICD-10-CM

## 2025-02-25 PROCEDURE — 99214 OFFICE O/P EST MOD 30 MIN: CPT

## 2025-02-25 PROCEDURE — G2211 COMPLEX E/M VISIT ADD ON: CPT

## 2025-02-28 RX ORDER — CLONAZEPAM 1 MG/1
1 TABLET ORAL
Qty: 10 | Refills: 0 | Status: ACTIVE | COMMUNITY
Start: 2025-02-28 | End: 1900-01-01

## 2025-03-01 ENCOUNTER — RX RENEWAL (OUTPATIENT)
Age: 30
End: 2025-03-01

## 2025-03-11 ENCOUNTER — APPOINTMENT (OUTPATIENT)
Dept: NEUROLOGY | Facility: CLINIC | Age: 30
End: 2025-03-11
Payer: COMMERCIAL

## 2025-03-11 PROCEDURE — 95816 EEG AWAKE AND DROWSY: CPT

## 2025-03-12 PROCEDURE — 95708 EEG WO VID EA 12-26HR UNMNTR: CPT

## 2025-03-13 ENCOUNTER — APPOINTMENT (OUTPATIENT)
Dept: NEUROLOGY | Facility: CLINIC | Age: 30
End: 2025-03-13

## 2025-03-13 PROCEDURE — 95700 EEG CONT REC W/VID EEG TECH: CPT

## 2025-03-13 PROCEDURE — 95721 EEG PHY/QHP>36<60 HR W/O VID: CPT

## 2025-03-13 PROCEDURE — 95708 EEG WO VID EA 12-26HR UNMNTR: CPT

## 2025-03-14 ENCOUNTER — TRANSCRIPTION ENCOUNTER (OUTPATIENT)
Age: 30
End: 2025-03-14

## 2025-03-18 ENCOUNTER — TRANSCRIPTION ENCOUNTER (OUTPATIENT)
Age: 30
End: 2025-03-18

## 2025-03-19 ENCOUNTER — TRANSCRIPTION ENCOUNTER (OUTPATIENT)
Age: 30
End: 2025-03-19

## 2025-04-23 ENCOUNTER — TRANSCRIPTION ENCOUNTER (OUTPATIENT)
Age: 30
End: 2025-04-23

## 2025-04-28 ENCOUNTER — TRANSCRIPTION ENCOUNTER (OUTPATIENT)
Age: 30
End: 2025-04-28

## 2025-04-29 ENCOUNTER — RX RENEWAL (OUTPATIENT)
Age: 30
End: 2025-04-29

## 2025-05-05 ENCOUNTER — RX RENEWAL (OUTPATIENT)
Age: 30
End: 2025-05-05

## 2025-05-07 ENCOUNTER — APPOINTMENT (OUTPATIENT)
Facility: CLINIC | Age: 30
End: 2025-05-07
Payer: COMMERCIAL

## 2025-05-07 VITALS
WEIGHT: 121.2 LBS | DIASTOLIC BLOOD PRESSURE: 69 MMHG | HEART RATE: 88 BPM | SYSTOLIC BLOOD PRESSURE: 112 MMHG | TEMPERATURE: 98.4 F | OXYGEN SATURATION: 96 % | BODY MASS INDEX: 21.47 KG/M2

## 2025-05-07 DIAGNOSIS — F41.9 ANXIETY DISORDER, UNSPECIFIED: ICD-10-CM

## 2025-05-07 DIAGNOSIS — G40.109 LOCALIZATION-RELATED (FOCAL) (PARTIAL) SYMPTOMATIC EPILEPSY AND EPILEPTIC SYNDROMES WITH SIMPLE PARTIAL SEIZURES, NOT INTRACTABLE, W/OUT STATUS EPILEPTICUS: ICD-10-CM

## 2025-05-07 PROCEDURE — 99214 OFFICE O/P EST MOD 30 MIN: CPT

## 2025-05-07 PROCEDURE — G2211 COMPLEX E/M VISIT ADD ON: CPT | Mod: NC

## 2025-05-08 ENCOUNTER — APPOINTMENT (OUTPATIENT)
Dept: NEUROLOGY | Facility: CLINIC | Age: 30
End: 2025-05-08

## 2025-05-23 ENCOUNTER — TRANSCRIPTION ENCOUNTER (OUTPATIENT)
Age: 30
End: 2025-05-23

## 2025-05-28 ENCOUNTER — TRANSCRIPTION ENCOUNTER (OUTPATIENT)
Age: 30
End: 2025-05-28

## 2025-06-02 ENCOUNTER — RX RENEWAL (OUTPATIENT)
Age: 30
End: 2025-06-02

## 2025-06-17 ENCOUNTER — RX RENEWAL (OUTPATIENT)
Age: 30
End: 2025-06-17

## 2025-07-01 ENCOUNTER — RX RENEWAL (OUTPATIENT)
Age: 30
End: 2025-07-01

## 2025-07-31 ENCOUNTER — TRANSCRIPTION ENCOUNTER (OUTPATIENT)
Age: 30
End: 2025-07-31

## 2025-07-31 ENCOUNTER — NON-APPOINTMENT (OUTPATIENT)
Age: 30
End: 2025-07-31

## 2025-09-02 ENCOUNTER — RX RENEWAL (OUTPATIENT)
Age: 30
End: 2025-09-02